# Patient Record
Sex: MALE | Race: WHITE | Employment: UNEMPLOYED | URBAN - METROPOLITAN AREA
[De-identification: names, ages, dates, MRNs, and addresses within clinical notes are randomized per-mention and may not be internally consistent; named-entity substitution may affect disease eponyms.]

---

## 2022-08-09 NOTE — PROGRESS NOTES
Name: Andre Wiley  YOB: 1975  Gender: male  MRN: [id number]    CC:   Chief Complaint   Patient presents with    Knee Pain     Right knee pain     Right medial KNEE PAIN; STIFFNESS     HPI: Complex history from June 22, 2021 when he fell on some water and tweaked his knee and hurt his back. He went to work comp that was denied. He ended up following with his primary doctor who sent him to therapy which did not seem to help. Eventually sent him to an MRI. Because of his wife's job they have moved around a good bit and he has had trouble getting further follow-up. He has had no injections. Pain is medial.  Intermittent pain but he fell today getting ready so was a little more inflamed. Also has chronic back issues. Has been on gabapentin for that. Not on File  History reviewed. No pertinent past medical history. History reviewed. No pertinent surgical history. History reviewed. No pertinent family history. Social History     Socioeconomic History    Marital status:      Spouse name: Not on file    Number of children: Not on file    Years of education: Not on file    Highest education level: Not on file   Occupational History    Not on file   Tobacco Use    Smoking status: Never    Smokeless tobacco: Never   Substance and Sexual Activity    Alcohol use: Not on file    Drug use: Not on file    Sexual activity: Not on file   Other Topics Concern    Not on file   Social History Narrative    Not on file     Social Determinants of Health     Financial Resource Strain: Not on file   Food Insecurity: Not on file   Transportation Needs: Not on file   Physical Activity: Not on file   Stress: Not on file   Social Connections: Not on file   Intimate Partner Violence: Not on file   Housing Stability: Not on file        No flowsheet data found. ROS:  Also noted on the patient medical history form in the chart and are reviewed today.  Pertinent positives and negatives are addressed with the patient, particularly those related to musculoskeletal concerns. Non-orthopaedic concerns were referred back to the primary care physician. Chronic back pain    PE:  General appearance is that of a healthy patient, alert and oriented, in no distress. Neck shows no significant abnormalities. Back and bilateral hips show no significant abnormalities with good ROM and no referral to LE with movement. No tenderness to bilateral hips. R and L upper extremities show no significant abnormalities. Both calves are soft. Dorsalis pedis pulses are 2+ and symmetrical.    Motor and sensory exam is intact and equal in both feet. KNEE Right(involved)  left   Skin Intact Intact   Atrophy None None   Effusion trace None noted   ROM  full Full   Strength No weakness No weakness   Palpation TTP medial joint line. Non-tender throughout   Patellar Tracking Normal: J sign: negative. Crepitus 1+ None   Patellar Apprehension Negative Negative   Maurizio Test positive Negative   Pivot Shift Negative Negative   Post Drawer Negative Negative   Varus  @ 0 and 30deg Negative Negative   Valgus @ 0 and 30deg Negative Negative   Gait antalgic Normal       An MRI of the right knee is reviewed from 6/30/2021. On the coronal images he has some edematous change possible cystic change in the periphery of the medial tibial plateau with some degenerative appearing tearing of the medial meniscus near the body and posterior horn. There may be a small flap of tissue in the medial gutter between the MCL and the tibial surface      Assessment:      ICD-10-CM    1. Right knee pain, unspecified chronicity  M25.561 triamcinolone acetonide (KENALOG-40) injection 80 mg     DRAIN/INJECT LARGE JOINT/BURSA     MRI KNEE RIGHT WO CONTRAST      2. Arthritis of right knee  M17.11 triamcinolone acetonide (KENALOG-40) injection 80 mg     DRAIN/INJECT LARGE JOINT/BURSA     MRI KNEE RIGHT WO CONTRAST      3.  Acute medial meniscus tear, right, initial encounter  S83.241A triamcinolone acetonide (KENALOG-40) injection 80 mg     DRAIN/INJECT LARGE JOINT/BURSA     MRI KNEE RIGHT WO CONTRAST          Plan:  I had a long discussion with the patient regarding the natural history of the problem, as well as treatment options. Discussed his exam is consistent with medial compartment arthritic change and possible meniscal tearing. His MRI is over a-year-old so I would suggest a follow-up MRI to evaluate this. Suggest an injection to help with acute pain. Also short course of oral NSAIDs. Treatment:   Given the chronicity and severity of the patient's symptoms, we will obtain an MRI. We will see them back after the scan and make a determination regarding further treatment. If there is a tear or other problem, they may require surgery. If negative, would recommend NSAID's, PT, or injection. They are amenable to this treatment plan. Patient prescribed with Non-steroidal anti-inflammatories after review of risks and benefits. We discussed additional activity modification to help reduce pain for initial conservative treatment. Procedure note:  After discussion of risks and benefits including, but not limited to pain, infection, steroid flare, fat necrosis, skin discoloration, increased blood sugar, and injury to blood vessels or nerves, the patient verbally consented to proceed with injection of the affected knee. We have discussed and they understand that decision to proceed with injection as part of the overall decision to avoid proceeding with major elective surgery. The Right knee(s) was sterilely prepped in standard fashion and injected with 2 cc of Kenalog (40mg/ml), 2 cc of Naropin (0.5%). The patient tolerated the injection(s) well. The dressing(s) was(were) applied. Return for After MRI.      Benny Barillas MD  08/11/22

## 2022-08-10 ENCOUNTER — OFFICE VISIT (OUTPATIENT)
Dept: ORTHOPEDIC SURGERY | Age: 47
End: 2022-08-10
Payer: COMMERCIAL

## 2022-08-10 DIAGNOSIS — S83.241A ACUTE MEDIAL MENISCUS TEAR, RIGHT, INITIAL ENCOUNTER: ICD-10-CM

## 2022-08-10 DIAGNOSIS — M25.561 RIGHT KNEE PAIN, UNSPECIFIED CHRONICITY: Primary | ICD-10-CM

## 2022-08-10 DIAGNOSIS — M17.11 ARTHRITIS OF RIGHT KNEE: ICD-10-CM

## 2022-08-10 DIAGNOSIS — M25.562 LEFT KNEE PAIN, UNSPECIFIED CHRONICITY: ICD-10-CM

## 2022-08-10 PROCEDURE — 20610 DRAIN/INJ JOINT/BURSA W/O US: CPT | Performed by: ORTHOPAEDIC SURGERY

## 2022-08-10 PROCEDURE — 99204 OFFICE O/P NEW MOD 45 MIN: CPT | Performed by: ORTHOPAEDIC SURGERY

## 2022-08-10 RX ORDER — TRIAMCINOLONE ACETONIDE 40 MG/ML
80 INJECTION, SUSPENSION INTRA-ARTICULAR; INTRAMUSCULAR ONCE
Status: COMPLETED | OUTPATIENT
Start: 2022-08-10 | End: 2022-08-10

## 2022-08-10 RX ADMIN — TRIAMCINOLONE ACETONIDE 80 MG: 40 INJECTION, SUSPENSION INTRA-ARTICULAR; INTRAMUSCULAR at 11:11

## 2022-08-11 RX ORDER — MELOXICAM 7.5 MG/1
7.5 TABLET ORAL 2 TIMES DAILY PRN
Qty: 28 TABLET | Refills: 0 | Status: SHIPPED | OUTPATIENT
Start: 2022-08-11 | End: 2022-08-25

## 2022-09-16 ENCOUNTER — HOSPITAL ENCOUNTER (OUTPATIENT)
Dept: MRI IMAGING | Age: 47
Discharge: HOME | End: 2022-09-19
Payer: COMMERCIAL

## 2022-09-16 DIAGNOSIS — S83.241A ACUTE MEDIAL MENISCUS TEAR, RIGHT, INITIAL ENCOUNTER: ICD-10-CM

## 2022-09-16 DIAGNOSIS — M25.561 RIGHT KNEE PAIN, UNSPECIFIED CHRONICITY: ICD-10-CM

## 2022-09-16 DIAGNOSIS — M17.11 ARTHRITIS OF RIGHT KNEE: ICD-10-CM

## 2022-09-16 PROCEDURE — 73721 MRI JNT OF LWR EXTRE W/O DYE: CPT

## 2022-11-16 ENCOUNTER — OFFICE VISIT (OUTPATIENT)
Dept: ORTHOPEDIC SURGERY | Age: 47
End: 2022-11-16

## 2022-11-16 VITALS — BODY MASS INDEX: 49.44 KG/M2 | WEIGHT: 315 LBS | HEIGHT: 67 IN

## 2022-11-16 DIAGNOSIS — S83.241D ACUTE MEDIAL MENISCUS TEAR OF RIGHT KNEE, SUBSEQUENT ENCOUNTER: ICD-10-CM

## 2022-11-16 DIAGNOSIS — M25.561 RIGHT KNEE PAIN, UNSPECIFIED CHRONICITY: Primary | ICD-10-CM

## 2022-11-16 DIAGNOSIS — M17.11 PRIMARY OSTEOARTHRITIS OF RIGHT KNEE: ICD-10-CM

## 2022-11-16 DIAGNOSIS — M17.11 ARTHRITIS OF RIGHT KNEE: ICD-10-CM

## 2022-11-16 PROBLEM — I10 HYPERTENSION: Status: ACTIVE | Noted: 2022-09-14

## 2022-11-16 PROBLEM — R29.90 NEUROSENSORY DEFICIT: Status: ACTIVE | Noted: 2022-09-14

## 2022-11-16 PROBLEM — R07.89 CHEST TIGHTNESS: Status: ACTIVE | Noted: 2022-09-14

## 2022-11-16 PROBLEM — G51.0 BELL'S PALSY: Status: ACTIVE | Noted: 2022-09-14

## 2022-11-16 PROBLEM — M54.50 LOWER BACK PAIN: Status: ACTIVE | Noted: 2022-09-14

## 2022-11-16 PROBLEM — K21.9 GERD (GASTROESOPHAGEAL REFLUX DISEASE): Status: ACTIVE | Noted: 2022-09-14

## 2022-11-16 RX ORDER — LOSARTAN POTASSIUM 50 MG/1
TABLET ORAL
COMMUNITY
Start: 2022-10-21

## 2022-11-16 RX ORDER — AMLODIPINE BESYLATE 5 MG/1
TABLET ORAL
COMMUNITY
Start: 2022-09-14

## 2022-11-16 RX ORDER — LORATADINE 10 MG/1
10 TABLET ORAL DAILY
COMMUNITY

## 2022-11-16 RX ORDER — OMEPRAZOLE 40 MG/1
40 CAPSULE, DELAYED RELEASE ORAL DAILY
COMMUNITY

## 2022-11-16 RX ORDER — PREGABALIN 75 MG/1
CAPSULE ORAL
COMMUNITY
Start: 2022-10-12

## 2022-11-16 RX ORDER — DICLOFENAC SODIUM 10 MG/G
GEL TOPICAL
COMMUNITY
Start: 2022-10-21

## 2022-11-16 RX ORDER — LISINOPRIL 20 MG/1
20 TABLET ORAL DAILY
COMMUNITY
Start: 2022-09-14

## 2022-11-16 RX ORDER — METOPROLOL TARTRATE 50 MG/1
TABLET, FILM COATED ORAL
COMMUNITY
Start: 2022-09-14

## 2022-11-16 RX ORDER — TRIAMCINOLONE ACETONIDE 40 MG/ML
80 INJECTION, SUSPENSION INTRA-ARTICULAR; INTRAMUSCULAR ONCE
Status: SHIPPED | OUTPATIENT
Start: 2022-11-16

## 2022-11-16 RX ORDER — LORAZEPAM 1 MG/1
TABLET ORAL
COMMUNITY
Start: 2022-11-02

## 2022-11-16 RX ORDER — ATORVASTATIN CALCIUM 80 MG/1
80 TABLET, FILM COATED ORAL
COMMUNITY
Start: 2022-09-14

## 2022-11-16 RX ORDER — METOPROLOL TARTRATE 50 MG/1
50 TABLET, FILM COATED ORAL 2 TIMES DAILY
COMMUNITY
Start: 2022-09-14

## 2022-11-16 RX ORDER — ASPIRIN 81 MG/1
81 TABLET ORAL DAILY
COMMUNITY
Start: 2022-09-15

## 2022-11-16 RX ORDER — FUROSEMIDE 20 MG/1
20 TABLET ORAL DAILY
COMMUNITY
Start: 2022-09-14

## 2022-11-16 RX ORDER — AMLODIPINE BESYLATE 5 MG/1
5 TABLET ORAL DAILY
COMMUNITY
Start: 2022-09-14

## 2022-11-16 RX ORDER — MELOXICAM 15 MG/1
TABLET ORAL
COMMUNITY
Start: 2022-10-21

## 2022-11-16 RX ORDER — AMLODIPINE BESYLATE 10 MG/1
TABLET ORAL
COMMUNITY
Start: 2022-10-13

## 2022-11-16 RX ORDER — ATORVASTATIN CALCIUM 80 MG/1
TABLET, FILM COATED ORAL
COMMUNITY
Start: 2022-09-14

## 2022-11-16 RX ORDER — VENLAFAXINE HYDROCHLORIDE 37.5 MG/1
CAPSULE, EXTENDED RELEASE ORAL
COMMUNITY
Start: 2022-10-21

## 2022-11-16 RX ORDER — FUROSEMIDE 20 MG/1
TABLET ORAL
COMMUNITY
Start: 2022-09-14

## 2022-11-16 RX ORDER — LISINOPRIL 20 MG/1
TABLET ORAL
COMMUNITY
Start: 2022-09-14

## 2022-11-16 NOTE — PROGRESS NOTES
Name: Eric Madden  YOB: 1975  Gender: male  MRN: [id number]    CC:   Chief Complaint   Patient presents with    Follow-up     MRI results right knee        HPI: Patient presents for follow-up of right knee pain. Patient was last seen on 8- when he had a cortisone injection. Patient was also given Mobic at that time. He notes only 1 week relief after his initial visit. The patient has since obtained an MRI of the knee. Today the patient states continued medial sided knee pain. Patient does note occasional swelling. He also notes some numbness and tingling that goes into the big toe. He does note some lumbar spine symptoms and that he is set up for neurology as well as an EMG/NCS. Allergies   Allergen Reactions    Codeine      Other reaction(s): Hives/Swelling-Allergy     History reviewed. No pertinent past medical history. History reviewed. No pertinent surgical history. History reviewed. No pertinent family history. Social History     Socioeconomic History    Marital status:      Spouse name: Not on file    Number of children: Not on file    Years of education: Not on file    Highest education level: Not on file   Occupational History    Not on file   Tobacco Use    Smoking status: Never    Smokeless tobacco: Never   Substance and Sexual Activity    Alcohol use: Not on file    Drug use: Not on file    Sexual activity: Not on file   Other Topics Concern    Not on file   Social History Narrative    Not on file     Social Determinants of Health     Financial Resource Strain: Not on file   Food Insecurity: Not on file   Transportation Needs: Not on file   Physical Activity: Not on file   Stress: Not on file   Social Connections: Not on file   Intimate Partner Violence: Not on file   Housing Stability: Not on file        No flowsheet data found.     Review of Systems  Non-contributory    PE right knee:    KNEE Right(involved)  left   Skin Intact Intact   Atrophy None None Effusion trace None noted   ROM  full Full   Strength No weakness No weakness   Palpation TTP medial joint line. Non-tender throughout   Patellar Tracking Normal: J sign: negative. Crepitus 1+ None   Patellar Apprehension Negative Negative   Maurizio Test positive Negative   Pivot Shift Negative Negative   Post Drawer Negative Negative   Varus  @ 0 and 30deg Negative Negative   Valgus @ 0 and 30deg Negative Negative   Gait antalgic Normal        Right knee MRI from 9-16-22:  Narrative   Exam: MRI right knee without contrast.   Indication: Right knee pain   Comparison: MRI right knee, 7/30/2021   Technique: Multiplanar multisequence imaging of the knee without contrast.       FINDINGS:   Menisci: There is complex predominantly radial tearing involving the medial   meniscal body which is unchanged in appearance from prior. There is mild free   edge radial tearing involving the lateral meniscal anterior and posterior horns   which is slightly increased in conspicuity, though potentially attributable to   technique. Cruciate ligaments: ACL and PCL are intact. Collateral ligaments: MCL and lateral collateral complex are intact. Tendons: The tendons including the extensor mechanism are intact. Bones: No fracture, contusion, or malalignment. Tricompartmental marginal   osteophytes. Articular cartilage:    Patellofemoral compartment: Partial thickness chondral loss in the central   trochlear sulcus is unchanged. Questionable small focus of partial-thickness   delamination to the patellar median ridge, not definitively seen on the prior. Medial compartment: No significant change in high-grade and full thickness   chondral loss throughout the weightbearing medial compartment with mild   subchondral marrow changes along the tibial plateau. Lateral compartment: Intact. Miscellaneous: Small joint effusion.  There is subcutaneous edema which is most   pronounced along the anterolateral aspect of the knee and lower leg. Impression   1. Unchanged complex predominantly radial tear of the medial meniscal body. 2. Mild free edge radial tearing of the lateral meniscal anterior and posterior   horns, slightly increased in conspicuity, though potentially attributable to   differences in technique. 3. Unchanged advanced chondral loss in the weightbearing medial compartment with   mild subchondral marrow changes to the tibial plateau. 4. Questionable small focus of new partial thickness chondral delamination to   the patellar median ridge. 5. Small joint effusion with subcutaneous edema most pronounced along the   anterolateral aspect of the knee and lower leg. MRIs independently reviewed. Patient has small area of tearing of the medial meniscus which is unchanged essentially based on prior review. Degenerative tearing of the lateral meniscus also was noted. He has advanced chondral arthritic change in the medial compartment with marrow edema seen at least on coronal image 17. Small effusion. A/Plan:     ICD-10-CM    1. Right knee pain, unspecified chronicity  M25.561       2. Arthritis of right knee  M17.11       3. Acute medial meniscus tear of right knee, subsequent encounter  S83.441D            I had an extensive discussion with the patient regarding both his meniscal pathology as well as his degenerative changes with marrow edema seen on MRI. We discussed from a surgical perspective, it is difficult to know how much relief and arthroscopy will offer. We discussed that this will not get rid of his degenerative changes. We discussed viscosupplementation as an option more conservatively. Patient also had questions about a knee replacement. I told him that he was rather young for this, but I would be more than happy to set him up with a joint surgeon for further discussion.   Currently he would like to try another steroid injection and then transition to viscosupplementation. He would like to participate in therapy as well. If not improving could still consider arthroscopy versus referral.  Discussed weight loss options and he is trying to proceed with gastric bypass they are just waiting on the surgeon. Procedure note:  After discussion of risks and benefits including, but not limited to pain, infection, steroid flare, fat necrosis, skin discoloration, increased blood sugar, and injury to blood vessels or nerves, the patient verbally consented to proceed with injection of the affected knee. We have discussed and they understand that decision to proceed with injection as part of the overall decision to avoid proceeding with major elective surgery. The Right knee(s) was sterilely prepped in standard fashion and injected with 2 cc of Kenalog (40mg/ml), 2 cc of Naropin (0.5%). The patient tolerated the injection(s) well. The dressing(s) was(were) applied. Recommend therapy to evaluate and treat current complaints and pathology. A home exercise program was also discussed with the patient. Return for will call after visco authorized.         Ger Bassett MD  11/16/22

## 2022-11-18 ENCOUNTER — TELEPHONE (OUTPATIENT)
Dept: ORTHOPEDIC SURGERY | Age: 47
End: 2022-11-18

## 2022-11-18 NOTE — TELEPHONE ENCOUNTER
Left VM for pt letting him know that we like to give the injection about 2-3 weeks before we say it did or did not work. I explained that sometimes there can be an immediate effect and then wear off and then more relief again. I let him know that we did submit the form for VISCO and are waiting on authorization and will call him once those are authorized or denied.

## 2022-11-18 NOTE — TELEPHONE ENCOUNTER
He was to let BSK know how the knee injections helped. He may have gotten a little relief that day but not much and not for long.

## 2023-05-14 ENCOUNTER — HOSPITAL ENCOUNTER (EMERGENCY)
Facility: HOSPITAL | Age: 48
Discharge: HOME | End: 2023-05-14
Attending: EMERGENCY MEDICINE | Admitting: EMERGENCY MEDICINE
Payer: COMMERCIAL

## 2023-05-14 VITALS
WEIGHT: 315 LBS | RESPIRATION RATE: 16 BRPM | OXYGEN SATURATION: 98 % | TEMPERATURE: 97.8 F | HEART RATE: 58 BPM | DIASTOLIC BLOOD PRESSURE: 83 MMHG | HEIGHT: 68 IN | SYSTOLIC BLOOD PRESSURE: 127 MMHG | BODY MASS INDEX: 47.74 KG/M2

## 2023-05-14 DIAGNOSIS — J32.9 SINUSITIS, UNSPECIFIED CHRONICITY, UNSPECIFIED LOCATION: Primary | ICD-10-CM

## 2023-05-14 PROCEDURE — 99282 EMERGENCY DEPT VISIT SF MDM: CPT

## 2023-05-14 RX ORDER — MELATONIN
1000 DAILY
Status: ON HOLD | COMMUNITY

## 2023-05-14 RX ORDER — AMLODIPINE BESYLATE 10 MG/1
10 TABLET ORAL DAILY
Status: ON HOLD | COMMUNITY

## 2023-05-14 RX ORDER — PREGABALIN 75 MG/1
75 CAPSULE ORAL 3 TIMES DAILY
Status: ON HOLD | COMMUNITY
End: 2023-05-21

## 2023-05-14 RX ORDER — OMEPRAZOLE 40 MG/1
40 CAPSULE, DELAYED RELEASE ORAL DAILY
Status: ON HOLD | COMMUNITY

## 2023-05-14 RX ORDER — ASPIRIN 81 MG/1
81 TABLET, CHEWABLE ORAL DAILY
Status: ON HOLD | COMMUNITY

## 2023-05-14 RX ORDER — ATORVASTATIN CALCIUM 80 MG/1
80 TABLET, FILM COATED ORAL DAILY
Status: ON HOLD | COMMUNITY
End: 2023-05-21

## 2023-05-14 RX ORDER — LORATADINE 10 MG/1
10 TABLET ORAL DAILY
Status: ON HOLD | COMMUNITY

## 2023-05-14 RX ORDER — AZITHROMYCIN 250 MG/1
TABLET, FILM COATED ORAL
Qty: 6 TABLET | Refills: 0 | Status: SHIPPED | OUTPATIENT
Start: 2023-05-14 | End: 2023-05-19

## 2023-05-14 RX ORDER — HYDROXYZINE HYDROCHLORIDE 25 MG/1
25 TABLET, FILM COATED ORAL NIGHTLY PRN
Qty: 10 TABLET | Refills: 0 | Status: ON HOLD | OUTPATIENT
Start: 2023-05-14 | End: 2023-05-21

## 2023-05-14 RX ORDER — MELOXICAM 7.5 MG/1
7.5 TABLET ORAL 2 TIMES DAILY
Status: ON HOLD | COMMUNITY
End: 2023-05-21

## 2023-05-14 RX ORDER — METOPROLOL TARTRATE 50 MG/1
50 TABLET, FILM COATED ORAL 2 TIMES DAILY
Status: ON HOLD | COMMUNITY
End: 2023-05-21

## 2023-05-14 RX ORDER — FUROSEMIDE 20 MG/1
20 TABLET ORAL DAILY
Status: ON HOLD | COMMUNITY
End: 2023-05-21

## 2023-05-14 NOTE — ED PROVIDER NOTES
Time: 9:48 AM EDT  Date of encounter:  5/14/2023  Independent Historian/Clinical History and Information was obtained by:   Patient  Chief Complaint: facial pain    History is limited by: N/A    History of Present Illness:  Patient is a 48 y.o. year old male who presents to the emergency department for evaluation of facial pain, ear pain, headache, sense of fullness in his ears that is gotten worse over the past 2 days.  In addition, the patient reports that he recently started a new antipsychotic and has had some issues with sleepwalking.  He states that he was able to stop the antipsychotic however he does not know the name of his medication.  Patient denies chest pain or shortness of breath.  Patient denies back pain.  Patient has no neck pain.  Patient has no fever or chills.  Patient has no cough or hemoptysis.  The patient does report that he does have neuropathy and so the tingling and pain that he gets in his hands and feet are not new.    HPI    Patient Care Team  Primary Care Provider: Provider, Ameena Known    Past Medical History:     Allergies   Allergen Reactions   • Codeine Hives     Past Medical History:   Diagnosis Date   • Anxiety    • Hypertension    • Intermittent explosive disorder    • Sleep apnea      Past Surgical History:   Procedure Laterality Date   • ESOPHAGEAL DILATATION     • JOINT REPLACEMENT       History reviewed. No pertinent family history.    Home Medications:  Prior to Admission medications    Medication Sig Start Date End Date Taking? Authorizing Provider   amLODIPine (NORVASC) 5 MG tablet Take 1 tablet by mouth Daily.    Baldomero Bardales MD   aspirin 81 MG chewable tablet Chew 1 tablet Daily.    Baldomero Bardales MD   atorvastatin (LIPITOR) 80 MG tablet Take 1 tablet by mouth Daily.    Baldomero Bardales MD   Cholecalciferol 25 MCG (1000 UT) tablet Take 1 tablet by mouth Daily.    Baldomero Bardales MD   furosemide (LASIX) 20 MG tablet Take 1 tablet by mouth Daily.    " Baldomero Bardales MD   LACTOBACILLUS PROBIOTIC PO Take  by mouth.    Baldomero Bardales MD   loratadine (CLARITIN) 10 MG tablet Take 1 tablet by mouth Daily.    Baldomero Bardales MD   meloxicam (MOBIC) 7.5 MG tablet Take 1 tablet by mouth 2 (Two) Times a Day.    Baldomero Bardales MD   metoprolol tartrate (LOPRESSOR) 50 MG tablet Take 1 tablet by mouth 2 (Two) Times a Day.    Baldomero Bardales MD   omeprazole (priLOSEC) 40 MG capsule Take 1 capsule by mouth Daily.    Baldomero Bardales MD   pregabalin (LYRICA) 75 MG capsule Take 1 capsule by mouth 3 (Three) Times a Day.    Baldomero Bardales MD        Social History:   Social History     Tobacco Use   • Smoking status: Never   • Smokeless tobacco: Never   Vaping Use   • Vaping Use: Never used   Substance Use Topics   • Alcohol use: Never   • Drug use: Never         Review of Systems:  Review of Systems   Constitutional: Negative for chills and fever.   HENT: Positive for congestion and ear pain. Negative for rhinorrhea and sore throat.    Eyes: Negative for pain and visual disturbance.   Respiratory: Negative for apnea, cough, chest tightness and shortness of breath.    Cardiovascular: Negative for chest pain and palpitations.   Gastrointestinal: Negative for abdominal pain, diarrhea, nausea and vomiting.   Genitourinary: Negative for difficulty urinating and dysuria.   Musculoskeletal: Negative for joint swelling and myalgias.   Skin: Negative for color change.   Neurological: Negative for seizures and headaches.   Psychiatric/Behavioral: Negative.    All other systems reviewed and are negative.       Physical Exam:  /83   Pulse 58   Temp 97.8 °F (36.6 °C) (Oral)   Resp 16   Ht 172.7 cm (68\")   Wt (!) 145 kg (320 lb)   SpO2 98%   BMI 48.66 kg/m²     Physical Exam  Vitals and nursing note reviewed.   Constitutional:       General: He is not in acute distress.     Appearance: Normal appearance. He is not toxic-appearing.   HENT: "      Head: Normocephalic and atraumatic.      Jaw: There is normal jaw occlusion.      Ears:      Comments: (+) bilateral tm effusions  Eyes:      General: Lids are normal.      Extraocular Movements: Extraocular movements intact.      Conjunctiva/sclera: Conjunctivae normal.      Pupils: Pupils are equal, round, and reactive to light.   Cardiovascular:      Rate and Rhythm: Normal rate and regular rhythm.      Pulses: Normal pulses.      Heart sounds: Normal heart sounds.   Pulmonary:      Effort: Pulmonary effort is normal. No respiratory distress.      Breath sounds: Normal breath sounds. No wheezing or rhonchi.   Abdominal:      General: Abdomen is flat.      Palpations: Abdomen is soft.      Tenderness: There is no abdominal tenderness. There is no guarding or rebound.   Musculoskeletal:         General: Normal range of motion.      Cervical back: Normal range of motion and neck supple.      Right lower leg: No edema.      Left lower leg: No edema.   Skin:     General: Skin is warm and dry.   Neurological:      Mental Status: He is alert and oriented to person, place, and time. Mental status is at baseline.   Psychiatric:         Mood and Affect: Mood normal.                  Procedures:  Procedures      Medical Decision Making:      Comorbidities that affect care:    Hypertension    External Notes reviewed:    Previous Clinic Note: Patient was seen for evaluation of hypertension and Stafford's esophagus      The following orders were placed and all results were independently analyzed by me:  No orders of the defined types were placed in this encounter.      Medications Given in the Emergency Department:  Medications - No data to display     ED Course:         Labs:    Lab Results (last 24 hours)     ** No results found for the last 24 hours. **           Imaging:    No Radiology Exams Resulted Within Past 24 Hours      Differential Diagnosis and Discussion:    Facial Pain/Swelling: Differential diagnosis  includes but is not limited to temporal arteritis, intracranial tumors, neuralgias, dental disease, ocular disease, TMJ syndrome, salivary gland disorders, sinusitis, cluster headaches, migraines, and psychogenic.        The patient presented to the emergency department with symptoms consistent with sinusitis. The patient is now resting comfortably, feels better, is alert, talkative, interactive and in no distress after ED treatment. The patient appears well and is able to tolerate PO fluids. Repeat examination is unremarkable and benign. The patient is neurologically intact, has a normal mental status, and is ambulatory in the ED.  The history, exam, diagnostic testing (if any) and the patient's current condition do not suggest meningitis, stroke, sepsis, subarachnoid hemorrhage, intracranial bleeding, encephalitis, temporal arteritis or other significant pathology to warrant further testing, continued ED treatment, admission, neurological consultation, for other specialist evaluation at this point.  The patient was started on Zithromax emergency department.  The vital signs have been stable. The patient's condition is stable and appropriate for discharge. The patient will pursue further outpatient evaluation with the primary care physician or other designated or consulting physician as indicated in the discharge instructions.  Patient reports that he has a follow-up appointment with his primary care doctor tomorrow.    MDM  Number of Diagnoses or Management Options           Patient Care Considerations:    CT HEAD: I considered ordering a noncontrast CT of the head, however Patient has a baseline mental status and a normal neurological exam.      Consultants/Shared Management Plan:    None    Social Determinants of Health:    Patient is independent, reliable, and has access to care.       Disposition and Care Coordination:    Discharged: The patient is suitable and stable for discharge with no need for  consideration of observation or admission.    I have explained the patient´s condition, diagnoses and treatment plan based on the information available to me at this time. I have answered questions and addressed any concerns. The patient has a good  understanding of the patient´s diagnosis, condition, and treatment plan as can be expected at this point. The vital signs have been stable. The patient´s condition is stable and appropriate for discharge from the emergency department.      The patient will pursue further outpatient evaluation with the primary care physician or other designated or consulting physician as outlined in the discharge instructions. They are agreeable to this plan of care and follow-up instructions have been explained in detail. The patient has received these instructions in written format and have expressed an understanding of the discharge instructions. The patient is aware that any significant change in condition or worsening of symptoms should prompt an immediate return to this or the closest emergency department or call to 911.  I have explained discharge medications and the need for follow up with the patient/caretakers. This was also printed in the discharge instructions. Patient was discharged with the following medications and follow up:      Medication List      New Prescriptions    azithromycin 250 MG tablet  Commonly known as: ZITHROMAX  Take 2 tablets by mouth Daily for 1 day, THEN 1 tablet Daily for 4 days.  Start taking on: May 14, 2023     hydrOXYzine 25 MG tablet  Commonly known as: ATARAX  Take 1 tablet by mouth At Night As Needed (sleep).           Where to Get Your Medications      These medications were sent to [facility]/pharmacy #[id number] - [place], [place] - [address] - [phone] [facility] [phone] FX  [address] [place] [address]    Hours: 24-hours Phone: [phone]   · azithromycin 250 MG tablet  · hydrOXYzine 25 MG tablet      Provider, No Known  [employer]  [facility] [id number]    In 2 days         Final diagnoses:   Sinusitis, unspecified chronicity, unspecified location        ED Disposition     ED Disposition   Discharge    Condition   Stable    Comment   --             This medical record created using voice recognition software.           Cristiano Valenzuela MD  05/14/23 1012       Cristiano Valenzuela MD  05/14/23 1031

## 2023-05-21 ENCOUNTER — APPOINTMENT (OUTPATIENT)
Dept: CT IMAGING | Facility: HOSPITAL | Age: 48
End: 2023-05-21
Payer: COMMERCIAL

## 2023-05-21 ENCOUNTER — HOSPITAL ENCOUNTER (INPATIENT)
Facility: HOSPITAL | Age: 48
LOS: 2 days | Discharge: HOME | End: 2023-05-23
Attending: EMERGENCY MEDICINE | Admitting: INTERNAL MEDICINE
Payer: COMMERCIAL

## 2023-05-21 ENCOUNTER — APPOINTMENT (OUTPATIENT)
Dept: CARDIOLOGY | Facility: HOSPITAL | Age: 48
End: 2023-05-21
Payer: COMMERCIAL

## 2023-05-21 ENCOUNTER — APPOINTMENT (OUTPATIENT)
Dept: GENERAL RADIOLOGY | Facility: HOSPITAL | Age: 48
End: 2023-05-21
Payer: COMMERCIAL

## 2023-05-21 ENCOUNTER — APPOINTMENT (OUTPATIENT)
Dept: MRI IMAGING | Facility: HOSPITAL | Age: 48
End: 2023-05-21
Payer: COMMERCIAL

## 2023-05-21 DIAGNOSIS — R53.1 LEFT-SIDED WEAKNESS: Primary | ICD-10-CM

## 2023-05-21 DIAGNOSIS — R26.2 DIFFICULTY IN WALKING: ICD-10-CM

## 2023-05-21 DIAGNOSIS — Z78.9 DECREASED ACTIVITIES OF DAILY LIVING (ADL): ICD-10-CM

## 2023-05-21 DIAGNOSIS — R13.10 DYSPHAGIA, UNSPECIFIED TYPE: ICD-10-CM

## 2023-05-21 LAB
ABO GROUP BLD: NORMAL
ABO GROUP BLD: NORMAL
ALBUMIN SERPL-MCNC: 4.3 G/DL (ref 3.5–5.2)
ALBUMIN/GLOB SERPL: 1.4 G/DL
ALP SERPL-CCNC: 106 U/L (ref 39–117)
ALT SERPL W P-5'-P-CCNC: 66 U/L (ref 1–41)
AMPHET+METHAMPHET UR QL: NEGATIVE
ANION GAP SERPL CALCULATED.3IONS-SCNC: 9.6 MMOL/L (ref 5–15)
APTT PPP: 29.8 SECONDS (ref 24.2–34.2)
AST SERPL-CCNC: 45 U/L (ref 1–40)
BARBITURATES UR QL SCN: NEGATIVE
BASOPHILS # BLD AUTO: 0.03 10*3/MM3 (ref 0–0.2)
BASOPHILS NFR BLD AUTO: 0.4 % (ref 0–1.5)
BENZODIAZ UR QL SCN: NEGATIVE
BH CV ECHO MEAS - AO ROOT DIAM: 3.6 CM
BH CV ECHO MEAS - EDV(CUBED): 109.2 ML
BH CV ECHO MEAS - EDV(MOD-SP2): 126 ML
BH CV ECHO MEAS - EDV(MOD-SP4): 170 ML
BH CV ECHO MEAS - EF(MOD-BP): 61.5 %
BH CV ECHO MEAS - EF(MOD-SP2): 52.4 %
BH CV ECHO MEAS - EF(MOD-SP4): 67.8 %
BH CV ECHO MEAS - ESV(CUBED): 37.3 ML
BH CV ECHO MEAS - ESV(MOD-SP2): 60 ML
BH CV ECHO MEAS - ESV(MOD-SP4): 54.7 ML
BH CV ECHO MEAS - FS: 30.1 %
BH CV ECHO MEAS - IVS/LVPW: 1.06 CM
BH CV ECHO MEAS - IVSD: 1.19 CM
BH CV ECHO MEAS - LA DIMENSION: 3.8 CM
BH CV ECHO MEAS - LAT PEAK E' VEL: 13.2 CM/SEC
BH CV ECHO MEAS - LV DIASTOLIC VOL/BSA (35-75): 68.1 CM2
BH CV ECHO MEAS - LV MASS(C)D: 206.3 GRAMS
BH CV ECHO MEAS - LV SYSTOLIC VOL/BSA (12-30): 21.9 CM2
BH CV ECHO MEAS - LVIDD: 4.8 CM
BH CV ECHO MEAS - LVIDS: 3.3 CM
BH CV ECHO MEAS - LVOT AREA: 4.2 CM2
BH CV ECHO MEAS - LVOT DIAM: 2.3 CM
BH CV ECHO MEAS - LVPWD: 1.12 CM
BH CV ECHO MEAS - MED PEAK E' VEL: 10.8 CM/SEC
BH CV ECHO MEAS - MV A MAX VEL: 64.7 CM/SEC
BH CV ECHO MEAS - MV DEC SLOPE: 684 CM/SEC2
BH CV ECHO MEAS - MV DEC TIME: 0.18 MSEC
BH CV ECHO MEAS - MV E MAX VEL: 123 CM/SEC
BH CV ECHO MEAS - MV E/A: 1.9
BH CV ECHO MEAS - RVDD: 3 CM
BH CV ECHO MEAS - SI(MOD-SP2): 26.4 ML/M2
BH CV ECHO MEAS - SI(MOD-SP4): 46.2 ML/M2
BH CV ECHO MEAS - SV(MOD-SP2): 66 ML
BH CV ECHO MEAS - SV(MOD-SP4): 115.3 ML
BH CV ECHO MEAS - TAPSE (>1.6): 3.9 CM
BH CV ECHO MEASUREMENTS AVERAGE E/E' RATIO: 10.25
BILIRUB SERPL-MCNC: 0.4 MG/DL (ref 0–1.2)
BILIRUB UR QL STRIP: NEGATIVE
BLD GP AB SCN SERPL QL: NEGATIVE
BUN SERPL-MCNC: 19 MG/DL (ref 6–20)
BUN/CREAT SERPL: 21.8 (ref 7–25)
CALCIUM SPEC-SCNC: 9.1 MG/DL (ref 8.6–10.5)
CANNABINOIDS SERPL QL: NEGATIVE
CHLORIDE SERPL-SCNC: 101 MMOL/L (ref 98–107)
CLARITY UR: CLEAR
CO2 SERPL-SCNC: 30.4 MMOL/L (ref 22–29)
COCAINE UR QL: NEGATIVE
COLOR UR: YELLOW
CREAT SERPL-MCNC: 0.87 MG/DL (ref 0.76–1.27)
DEPRECATED RDW RBC AUTO: 47.2 FL (ref 37–54)
EGFRCR SERPLBLD CKD-EPI 2021: 107.8 ML/MIN/1.73
EOSINOPHIL # BLD AUTO: 0.2 10*3/MM3 (ref 0–0.4)
EOSINOPHIL NFR BLD AUTO: 3 % (ref 0.3–6.2)
ERYTHROCYTE [DISTWIDTH] IN BLOOD BY AUTOMATED COUNT: 13.9 % (ref 12.3–15.4)
FENTANYL UR-MCNC: NEGATIVE NG/ML
GLOBULIN UR ELPH-MCNC: 3 GM/DL
GLUCOSE BLDC GLUCOMTR-MCNC: 83 MG/DL (ref 70–99)
GLUCOSE BLDC GLUCOMTR-MCNC: 92 MG/DL (ref 70–99)
GLUCOSE SERPL-MCNC: 122 MG/DL (ref 65–99)
GLUCOSE UR STRIP-MCNC: NEGATIVE MG/DL
HCT VFR BLD AUTO: 39.6 % (ref 37.5–51)
HGB BLD-MCNC: 13.3 G/DL (ref 13–17.7)
HGB UR QL STRIP.AUTO: NEGATIVE
HOLD SPECIMEN: NORMAL
HOLD SPECIMEN: NORMAL
IMM GRANULOCYTES # BLD AUTO: 0.03 10*3/MM3 (ref 0–0.05)
IMM GRANULOCYTES NFR BLD AUTO: 0.4 % (ref 0–0.5)
INR PPP: 1.06 (ref 0.86–1.15)
KETONES UR QL STRIP: NEGATIVE
LEFT ATRIUM VOLUME INDEX: 26.1 ML/M2
LEUKOCYTE ESTERASE UR QL STRIP.AUTO: NEGATIVE
LYMPHOCYTES # BLD AUTO: 2 10*3/MM3 (ref 0.7–3.1)
LYMPHOCYTES NFR BLD AUTO: 29.7 % (ref 19.6–45.3)
MAXIMAL PREDICTED HEART RATE: 174 BPM
MCH RBC QN AUTO: 31.4 PG (ref 26.6–33)
MCHC RBC AUTO-ENTMCNC: 33.6 G/DL (ref 31.5–35.7)
MCV RBC AUTO: 93.6 FL (ref 79–97)
METHADONE UR QL SCN: NEGATIVE
MONOCYTES # BLD AUTO: 0.59 10*3/MM3 (ref 0.1–0.9)
MONOCYTES NFR BLD AUTO: 8.8 % (ref 5–12)
NEUTROPHILS NFR BLD AUTO: 3.89 10*3/MM3 (ref 1.7–7)
NEUTROPHILS NFR BLD AUTO: 57.7 % (ref 42.7–76)
NITRITE UR QL STRIP: NEGATIVE
NRBC BLD AUTO-RTO: 0 /100 WBC (ref 0–0.2)
OPIATES UR QL: NEGATIVE
OXYCODONE UR QL SCN: NEGATIVE
PH UR STRIP.AUTO: 7 [PH] (ref 5–8)
PLATELET # BLD AUTO: 191 10*3/MM3 (ref 140–450)
PMV BLD AUTO: 10.3 FL (ref 6–12)
POTASSIUM SERPL-SCNC: 3.6 MMOL/L (ref 3.5–5.2)
PROT SERPL-MCNC: 7.3 G/DL (ref 6–8.5)
PROT UR QL STRIP: NEGATIVE
PROTHROMBIN TIME: 13.9 SECONDS (ref 11.8–14.9)
QT INTERVAL: 422 MS
RBC # BLD AUTO: 4.23 10*6/MM3 (ref 4.14–5.8)
RH BLD: POSITIVE
RH BLD: POSITIVE
SODIUM SERPL-SCNC: 141 MMOL/L (ref 136–145)
SP GR UR STRIP: 1.02 (ref 1–1.03)
STRESS TARGET HR: 148 BPM
T&S EXPIRATION DATE: NORMAL
TROPONIN T SERPL HS-MCNC: 10 NG/L
UROBILINOGEN UR QL STRIP: NORMAL
WBC NRBC COR # BLD: 6.74 10*3/MM3 (ref 3.4–10.8)
WHOLE BLOOD HOLD COAG: NORMAL
WHOLE BLOOD HOLD SPECIMEN: NORMAL

## 2023-05-21 PROCEDURE — 80307 DRUG TEST PRSMV CHEM ANLYZR: CPT | Performed by: INTERNAL MEDICINE

## 2023-05-21 PROCEDURE — 93306 TTE W/DOPPLER COMPLETE: CPT

## 2023-05-21 PROCEDURE — 94799 UNLISTED PULMONARY SVC/PX: CPT

## 2023-05-21 PROCEDURE — 85730 THROMBOPLASTIN TIME PARTIAL: CPT

## 2023-05-21 PROCEDURE — 85610 PROTHROMBIN TIME: CPT

## 2023-05-21 PROCEDURE — 93306 TTE W/DOPPLER COMPLETE: CPT | Performed by: INTERNAL MEDICINE

## 2023-05-21 PROCEDURE — 25010000002 SULFUR HEXAFLUORIDE MICROSPH 60.7-25 MG RECONSTITUTED SUSPENSION: Performed by: INTERNAL MEDICINE

## 2023-05-21 PROCEDURE — 25010000002 ENOXAPARIN PER 10 MG: Performed by: INTERNAL MEDICINE

## 2023-05-21 PROCEDURE — 99285 EMERGENCY DEPT VISIT HI MDM: CPT

## 2023-05-21 PROCEDURE — 70498 CT ANGIOGRAPHY NECK: CPT

## 2023-05-21 PROCEDURE — 86850 RBC ANTIBODY SCREEN: CPT

## 2023-05-21 PROCEDURE — 82948 REAGENT STRIP/BLOOD GLUCOSE: CPT

## 2023-05-21 PROCEDURE — 99221 1ST HOSP IP/OBS SF/LOW 40: CPT | Performed by: STUDENT IN AN ORGANIZED HEALTH CARE EDUCATION/TRAINING PROGRAM

## 2023-05-21 PROCEDURE — 86901 BLOOD TYPING SEROLOGIC RH(D): CPT

## 2023-05-21 PROCEDURE — 80053 COMPREHEN METABOLIC PANEL: CPT

## 2023-05-21 PROCEDURE — 0042T HC CT CEREBRAL PERFUSION W/WO CONTRAST: CPT

## 2023-05-21 PROCEDURE — 70450 CT HEAD/BRAIN W/O DYE: CPT

## 2023-05-21 PROCEDURE — 99223 1ST HOSP IP/OBS HIGH 75: CPT | Performed by: INTERNAL MEDICINE

## 2023-05-21 PROCEDURE — 70551 MRI BRAIN STEM W/O DYE: CPT

## 2023-05-21 PROCEDURE — 25510000001 IOPAMIDOL PER 1 ML: Performed by: EMERGENCY MEDICINE

## 2023-05-21 PROCEDURE — 81003 URINALYSIS AUTO W/O SCOPE: CPT

## 2023-05-21 PROCEDURE — 70496 CT ANGIOGRAPHY HEAD: CPT

## 2023-05-21 PROCEDURE — 25010000002 LORAZEPAM PER 2 MG: Performed by: INTERNAL MEDICINE

## 2023-05-21 PROCEDURE — 85025 COMPLETE CBC W/AUTO DIFF WBC: CPT

## 2023-05-21 PROCEDURE — 93005 ELECTROCARDIOGRAM TRACING: CPT

## 2023-05-21 PROCEDURE — 71045 X-RAY EXAM CHEST 1 VIEW: CPT

## 2023-05-21 PROCEDURE — 86900 BLOOD TYPING SEROLOGIC ABO: CPT

## 2023-05-21 PROCEDURE — 94660 CPAP INITIATION&MGMT: CPT

## 2023-05-21 PROCEDURE — 93010 ELECTROCARDIOGRAM REPORT: CPT | Performed by: INTERNAL MEDICINE

## 2023-05-21 PROCEDURE — 84484 ASSAY OF TROPONIN QUANT: CPT

## 2023-05-21 RX ORDER — ACETAMINOPHEN 650 MG/1
650 SUPPOSITORY RECTAL EVERY 4 HOURS PRN
Status: DISCONTINUED | OUTPATIENT
Start: 2023-05-21 | End: 2023-05-23 | Stop reason: HOSPADM

## 2023-05-21 RX ORDER — NITROGLYCERIN 0.4 MG/1
0.4 TABLET SUBLINGUAL
Status: DISCONTINUED | OUTPATIENT
Start: 2023-05-21 | End: 2023-05-23 | Stop reason: HOSPADM

## 2023-05-21 RX ORDER — ENOXAPARIN SODIUM 100 MG/ML
40 INJECTION SUBCUTANEOUS DAILY
Status: DISCONTINUED | OUTPATIENT
Start: 2023-05-21 | End: 2023-05-23 | Stop reason: HOSPADM

## 2023-05-21 RX ORDER — SODIUM CHLORIDE 9 MG/ML
40 INJECTION, SOLUTION INTRAVENOUS AS NEEDED
Status: DISCONTINUED | OUTPATIENT
Start: 2023-05-21 | End: 2023-05-23 | Stop reason: HOSPADM

## 2023-05-21 RX ORDER — SODIUM CHLORIDE 0.9 % (FLUSH) 0.9 %
10 SYRINGE (ML) INJECTION AS NEEDED
Status: DISCONTINUED | OUTPATIENT
Start: 2023-05-21 | End: 2023-05-23 | Stop reason: HOSPADM

## 2023-05-21 RX ORDER — LABETALOL 100 MG/1
50 TABLET, FILM COATED ORAL ONCE
Status: DISCONTINUED | OUTPATIENT
Start: 2023-05-21 | End: 2023-05-21

## 2023-05-21 RX ORDER — ACETAMINOPHEN 325 MG/1
650 TABLET ORAL EVERY 4 HOURS PRN
Status: DISCONTINUED | OUTPATIENT
Start: 2023-05-21 | End: 2023-05-23 | Stop reason: HOSPADM

## 2023-05-21 RX ORDER — VENLAFAXINE HYDROCHLORIDE 75 MG/1
1 CAPSULE, EXTENDED RELEASE ORAL DAILY
COMMUNITY
Start: 2023-04-26

## 2023-05-21 RX ORDER — LORAZEPAM 0.5 MG/1
0.5 TABLET ORAL 3 TIMES DAILY
COMMUNITY
Start: 2023-05-17 | End: 2023-05-23 | Stop reason: HOSPADM

## 2023-05-21 RX ORDER — ASPIRIN 300 MG/1
300 SUPPOSITORY RECTAL DAILY
Status: DISCONTINUED | OUTPATIENT
Start: 2023-05-21 | End: 2023-05-23 | Stop reason: HOSPADM

## 2023-05-21 RX ORDER — LOSARTAN POTASSIUM AND HYDROCHLOROTHIAZIDE 25; 100 MG/1; MG/1
1 TABLET ORAL DAILY
COMMUNITY
Start: 2023-02-24

## 2023-05-21 RX ORDER — METOPROLOL SUCCINATE 200 MG/1
1 TABLET, EXTENDED RELEASE ORAL DAILY
Status: ON HOLD | COMMUNITY
Start: 2023-04-14 | End: 2023-05-23 | Stop reason: SDUPTHER

## 2023-05-21 RX ORDER — LORAZEPAM 2 MG/ML
1 INJECTION INTRAMUSCULAR ONCE
Status: COMPLETED | OUTPATIENT
Start: 2023-05-21 | End: 2023-05-21

## 2023-05-21 RX ORDER — LABETALOL HYDROCHLORIDE 5 MG/ML
5 INJECTION, SOLUTION INTRAVENOUS ONCE
Status: COMPLETED | OUTPATIENT
Start: 2023-05-21 | End: 2023-05-21

## 2023-05-21 RX ORDER — ARIPIPRAZOLE 5 MG/1
2.5-5 TABLET ORAL
COMMUNITY
Start: 2023-04-26

## 2023-05-21 RX ORDER — SODIUM CHLORIDE 0.9 % (FLUSH) 0.9 %
10 SYRINGE (ML) INJECTION EVERY 12 HOURS SCHEDULED
Status: DISCONTINUED | OUTPATIENT
Start: 2023-05-21 | End: 2023-05-23 | Stop reason: HOSPADM

## 2023-05-21 RX ORDER — ASPIRIN 81 MG/1
81 TABLET, CHEWABLE ORAL DAILY
Status: DISCONTINUED | OUTPATIENT
Start: 2023-05-21 | End: 2023-05-23 | Stop reason: HOSPADM

## 2023-05-21 RX ORDER — MELOXICAM 15 MG/1
15 TABLET ORAL DAILY
COMMUNITY
Start: 2023-05-02

## 2023-05-21 RX ADMIN — SULFUR HEXAFLUORIDE 3 ML: KIT at 14:35

## 2023-05-21 RX ADMIN — IOPAMIDOL 150 ML: 755 INJECTION, SOLUTION INTRAVENOUS at 08:12

## 2023-05-21 RX ADMIN — Medication 10 ML: at 13:33

## 2023-05-21 RX ADMIN — LORAZEPAM 1 MG: 2 INJECTION INTRAMUSCULAR; INTRAVENOUS at 15:38

## 2023-05-21 RX ADMIN — ENOXAPARIN SODIUM 40 MG: 100 INJECTION SUBCUTANEOUS at 13:32

## 2023-05-21 RX ADMIN — LABETALOL HYDROCHLORIDE 5 MG: 5 INJECTION, SOLUTION INTRAVENOUS at 19:39

## 2023-05-21 RX ADMIN — Medication 10 ML: at 20:48

## 2023-05-21 NOTE — ED NOTES
AS soon at patient falls asleep his O2 sat drop  Pt states he has sleep apnea and uses a CPAP at home   MD lopez

## 2023-05-21 NOTE — ED NOTES
"Pt states that he has been having \"episodes like this for 1 month\" pt has facial drooping and weakness to L side.   "

## 2023-05-21 NOTE — H&P
[facility]  [facility]: [facility]   Patient Name: Hardy Rendon  : 1975  MRN: [id number]  Primary Care Physician:  Provider, No Known  Date of admission: 2023    Subjective   Subjective     Chief Complaint: Left-sided numbness    HPI:    Hardy Rendon is a 48 y.o. male with history of obstructive sleep apnea on CPAP at home, hypertension, intermittent explosive disorder, anxiety who had a fall in his shower this morning.  Patient says he vaguely remembers the fall.  During fall he hit the back side of his head.  Did not have a bruise and the area does not hurt.  Says he may have lost consciousness for few minutes.  He is not sure if he was sleepwalking when he fell.  Upon waking up he was feeling well and called his wife who suggested he goes to the ER.  Patient drove himself to the ER.  On his way he felt numbness in his left side of the face and arm.  He felt left lower extremity weakness as well.  Upon presentation to the ER, there was possible left-sided facial droop with left extremity weakness.  Stat neurology consult was placed.  Per neurology note TNK was administered due to risk outweigh benefit.  CTA head and neck was negative for acute pathology.  During my encounter with the patient, patient did not have left-sided facial droop.  He had some left hand weakness and tingling in his hand.  He was able to lift his left lower extremity without any issues.      Personal History     Past Medical History:  Past Medical History:   Diagnosis Date   • Anxiety    • Hypertension    • Intermittent explosive disorder    • Sleep apnea          Past Surgical History:  Past Surgical History:   Procedure Laterality Date   • ESOPHAGEAL DILATATION     • JOINT REPLACEMENT           Family History:   History reviewed. No pertinent family history.      Social History:   Social History     Socioeconomic History   • Marital status:    Tobacco Use   • Smoking status: Never   •  Smokeless tobacco: Never   Vaping Use   • Vaping Use: Never used   Substance and Sexual Activity   • Alcohol use: Never   • Drug use: Never         Home Medications:  Lactobacillus, amLODIPine, aspirin, atorvastatin, cholecalciferol, furosemide, hydrOXYzine, loratadine, meloxicam, metoprolol tartrate, omeprazole, and pregabalin    Allergies:  Allergies   Allergen Reactions   • Codeine Hives       Review of Systems   All systems were reviewed and negative except for: left hand weakness    Objective   Objective     Vitals:   Temp:  [98.7 °F (37.1 °C)] 98.7 °F (37.1 °C)  Heart Rate:  [51-79] 73  Resp:  [16] 16  BP: (147-176)/(73-98) 154/83    Physical Exam    Constitutional: Awake, alert, no acute distress   Eyes: Pupils equal, sclerae anicteric, no conjunctival injection   HENT: NCAT, mucous membranes moist   Neck: Supple, no thyromegaly, no lymphadenopathy, trachea midline   Respiratory: Clear to auscultation bilaterally, nonlabored respirations    Cardiovascular: RRR, no murmurs, rubs, or gallops, palpable pedal pulses bilaterally   Gastrointestinal: Positive bowel sounds, soft, nontender, nondistended   Musculoskeletal: No bilateral ankle edema, no clubbing or cyanosis to extremities   Psychiatric: Appropriate affect, cooperative   Neurologic: Oriented x 3, decrease left hand  strength. speech clear, no facial droop   Skin: No rashes     Result Review    Result Review:  I have personally reviewed the results from the time of this admission to 5/21/2023 10:51 EDT and agree with these findings:  [x]  Laboratory  []  Microbiology  [x]  Radiology  [x]  EKG/Telemetry   [x]  Cardiology/Vascular   []  Pathology  []  Old records  []  Other:      Assessment & Plan   Assessment / Plan     Assessment/Plan:    Acute TIA   -No evidence of acute stroke / no acute pathology on CTA head and neck  -Aspirin AK started. Can switch to p.o. once cleared by SLP.  Start atorvastatin 80 mg at that time as well.  -Evaluated by  neurology.  Not a candidate for TNK.  Neurology consulted.  -2D echo ordered.  -MRI brain ordered.  -Hold blood pressure medicines to allow for permissive hypertension  -telemetry bed   -check UDS    HTN  -Hold blood pressure medicines to allow for permissive hypertension  -Restart blood pressure medicines once cleared by neurology    JACOB   -Continue home CPAP    Intermittent explosive disorder  -Currently stable.  -Follows outpatient with psychiatrist.  Has been recently tried on various medications but was not able to tolerate any of them.    Elevated ALT/ AST   -mildly elevated   -can follow up outpatient with pcp for further work up  -monitor closely if atorvastatin is started      DVT prophylaxis:  Medical and mechanical DVT prophylaxis orders are present.    CODE STATUS:    Code Status (Patient has no pulse and is not breathing): CPR (Attempt to Resuscitate)  Medical Interventions (Patient has pulse or is breathing): Full Support  Release to patient: Routine Release      Admission Status:  I believe this patient meets inpatient status.    Electronically signed by Olvin Johnson MD, 05/21/23, 10:32 AM EDT.

## 2023-05-21 NOTE — CONSULTS
[facility]  [facility]      Patient Name:   Hardy Rendon  YOB: 1975  Identification Number:   MRN - [id number]  Date of Service:   05/21/2023 06:28:19    Diagnosis:      •  R53.1 - Weakness    Impression:      • 48yoM hx of HTN, sleep apnea, intermittent explosive disorder, and anxiety p/w left side weakness. Reliable LKW was 1:30AM. He went to shower sometimes after waking up, passed out for unclear reason, woke up with left side weakness. CTH neg for acute finding. Not thrombolytics candidate due to reliable LKW was > 4.5hr, possible GIB with bright blood in stool x 5 days with unknown cause, and previous episode of left side weakness may be TIA or CVA, no MRI done for evaluation. Explained to patient due to these concern, I would not recommend TNK due to risk outweigh benefit. Patient voiced understanding.      Recommend admission for stroke workup, continue aspirin, permissive HTn for 24hr. Exam is not suggestive of LVO. However given patient has disabling deficit and LKW within 24hr, recommend CTA/CTP.    Update: CTP neg for perfusion defect. CTA neg for LVO. pending radiology report.     Our recommendations are outlined below.    Recommendations:        •  Stroke/Telemetry Floor      •  Neuro Checks      •  Bedside Swallow Eval      •  DVT Prophylaxis      •  IV Fluids, Normal Saline      •  Head of Bed 30 Degrees      •  Euglycemia and Avoid Hyperthermia (PRN Acetaminophen)      •  Initiate or continue Aspirin 81 MG daily      •  Antihypertensives PRN if Blood pressure is greater than 220/120 or there is a concern for End organ damage/contraindications for permissive HTN. If blood pressure is greater than 220/120 give labetalol PO or IV or Vasotec IV with a goal of 15% reduction in BP during the first 24 hours.    Lipid Panel to Be Obtained, if Not Done in the Last 30 Days    Therapies:      •  Physical Therapy, Occupational Therapy, Speech Therapy  Assessment When Applicable    Dysphagia:      •  Swallow Evaluation, Bedside      •  NPO Until Swallow Evaluation    DVT prophylaxis:      •  Choice of Primary Team    Disposition:      •  Neurology Follow Up Recommended    Sign Out:      •  Discussed with Emergency Department Provider        ------------------------------------------------------------------------------    Advanced Imaging:  Advanced imaging has been ordered. Results pending.      Metrics:  Last Known Well: 05/21/2023 01:30:00  [facility] Notification Time: 05/21/2023 06:27:42  Arrival Time: 05/21/2023 06:25:00  Stamp Time: 05/21/2023 06:28:19  Initial Response Time: 05/21/2023 06:33:24  Symptoms: left side weakness .  Initial patient interaction: 05/21/2023 06:40:23  NIHSS Assessment Completed: 05/21/2023 06:56:23  Patient is not a candidate for Thrombolytic.  Thrombolytic Medical Decision: 05/21/2023 06:56:23  Patient was not deemed candidate for Thrombolytic because of following reasons:  GI Bleeding (Within 21 Days).    I personally Reviewed the CT Head and it Showed    Primary Provider Notified of Diagnostic Impression and Management Plan on: 05/21/2023 07:07:05        ------------------------------------------------------------------------------    Additional Comments:       Awaiting on history by family on potential contraindications    History of Present Illness:  Patient is a 48 year old Male.    Patient was brought by private transportation with symptoms of left side weakness .  Patient went to sleep at 1:30AM at baseline. He woke up something in the middle of the night and went to take a shower. He passed out in the shower for unclear duration and woke up with left sided weakness. He drove himself to the hospital and called his wife at 5:20AM. En route he noticed his left face was drooping. Patient reports he had 2-3 episode of left side weakness in the past month, lasted about 1 hour and he can usually shake it off. He reports  bright blood in the stool in past 5 days, denies any current hemorrhoid that he know of. Unclear cause of blood at this time.      Past Medical History:      • Hypertension  Othere PMH:  anxiety, HTN, intermittent explosive disorder, sleep apnea    Medications:    No Anticoagulant use   Antiplatelet use: Yes aspirin  Reviewed EMR for current medications    Allergies:   Reviewed  Description: codeine    Social History:  Smoking: No    Family History:    There is no family history of premature cerebrovascular disease pertinent to this consultation    ROS :  14 Points Review of Systems was performed and was negative except mentioned in HPI.    Past Surgical History:  There Is No Surgical History Contributory To Today’s Visit        Examination:  BP(143/73), Pulse(70),  1A: Level of Consciousness - Alert; keenly responsive + 0  1B: Ask Month and Age - Both Questions Right + 0  1C: Blink Eyes & Squeeze Hands - Performs Both Tasks + 0  2: Test Horizontal Extraocular Movements - Normal + 0  3: Test Visual Fields - No Visual Loss + 0  4: Test Facial Palsy (Use Grimace if Obtunded) - Unilateral Complete paralysis (upper/lower face) + 3  5A: Test Left Arm Motor Drift - No Drift for 10 Seconds + 0  5B: Test Right Arm Motor Drift - No Drift for 10 Seconds + 0  6A: Test Left Leg Motor Drift - Drift, but doesn't hit bed + 1  6B: Test Right Leg Motor Drift - No Drift for 5 Seconds + 0  7: Test Limb Ataxia (FNF/Heel-Shin) - No Ataxia + 0  8: Test Sensation - Mild-Moderate Loss: Less Sharp/More Dull + 1  9: Test Language/Aphasia - Normal; No aphasia + 0  10: Test Dysarthria - Mild-Moderate Dysarthria: Slurring but can be understood + 1  11: Test Extinction/Inattention - No abnormality + 0    NIHSS Score: 6  NIHSS Free Text : decrease sensation on left side arm leg. Left  weakness    Pre-Morbid Modified Kindra Scale:  0 Points = No symptoms at all    I reviewed the available imaging via A.I. software Rapid and initiated  discussion with the primary provider    Patient/Family was informed the Neurology Consult would occur via TeleHealth consult by way of interactive audio and video telecommunications and consented to receiving care in this manner.      Patient is being evaluated for possible acute neurologic impairment and high probability of imminent or life-threatening deterioration. I spent total of 35 minutes providing care to this patient, including time for face to face visit via telemedicine, review of medical records, imaging studies and discussion of findings with providers, the patient and/or family.      Dr Tiffanie Clayton      [facility]  [phone]    Case [id number]

## 2023-05-21 NOTE — ED PROVIDER NOTES
Time: 10:13 AM EDT  Date of encounter:  5/21/2023  Independent Historian/Clinical History and Information was obtained by:   Patient  Chief Complaint: Left-sided weakness    History is limited by: N/A    History of Present Illness:  Patient is a 48 y.o. year old male who presents to the emergency department for evaluation of left-sided weakness.  Patient reports left facial weakness, left upper extremity weakness but that the symptoms have been coming and going for couple days as well.  Denies headache.    HPI    Patient Care Team  Primary Care Provider: Provider, Ameena Known    Past Medical History:     Allergies   Allergen Reactions   • Codeine Hives     Past Medical History:   Diagnosis Date   • Anxiety    • Hypertension    • Intermittent explosive disorder    • Sleep apnea      Past Surgical History:   Procedure Laterality Date   • ESOPHAGEAL DILATATION       History reviewed. No pertinent family history.    Home Medications:  Prior to Admission medications    Medication Sig Start Date End Date Taking? Authorizing Provider   amLODIPine (NORVASC) 5 MG tablet Take 1 tablet by mouth Daily.    Baldomero Bardales MD   aspirin 81 MG chewable tablet Chew 1 tablet Daily.    Baldomero Bardales MD   atorvastatin (LIPITOR) 80 MG tablet Take 1 tablet by mouth Daily.    Baldomero Bardales MD   Cholecalciferol 25 MCG (1000 UT) tablet Take 1 tablet by mouth Daily.    Baldomero Bardales MD   furosemide (LASIX) 20 MG tablet Take 1 tablet by mouth Daily.    Baldomero Bardales MD   hydrOXYzine (ATARAX) 25 MG tablet Take 1 tablet by mouth At Night As Needed (sleep). 5/14/23   Cristiano Valenzuela MD   LACTOBACILLUS PROBIOTIC PO Take  by mouth.    Baldomero Bardales MD   loratadine (CLARITIN) 10 MG tablet Take 1 tablet by mouth Daily.    Baldomero Bardales MD   meloxicam (MOBIC) 7.5 MG tablet Take 1 tablet by mouth 2 (Two) Times a Day.    Baldomero Bardales MD   metoprolol tartrate (LOPRESSOR) 50 MG tablet Take 1  "tablet by mouth 2 (Two) Times a Day.    ProviderBaldomero MD   omeprazole (priLOSEC) 40 MG capsule Take 1 capsule by mouth Daily.    ProviderBaldomero MD   pregabalin (LYRICA) 75 MG capsule Take 1 capsule by mouth 3 (Three) Times a Day.    ProviderBaldomero MD        Social History:   Social History     Tobacco Use   • Smoking status: Never   • Smokeless tobacco: Never   Vaping Use   • Vaping Use: Never used   Substance Use Topics   • Alcohol use: Never   • Drug use: Never         Review of Systems:  Review of Systems   Constitutional: Negative for chills and fever.   HENT: Negative for congestion, rhinorrhea and sore throat.    Eyes: Negative for pain and visual disturbance.   Respiratory: Negative for apnea, cough, chest tightness and shortness of breath.    Cardiovascular: Negative for chest pain and palpitations.   Gastrointestinal: Negative for abdominal pain, diarrhea, nausea and vomiting.   Genitourinary: Negative for difficulty urinating and dysuria.   Musculoskeletal: Negative for joint swelling and myalgias.   Skin: Negative for color change.   Neurological: Negative for seizures and headaches.   Psychiatric/Behavioral: Negative.    All other systems reviewed and are negative.       Physical Exam:  /83   Pulse 73   Temp 98.7 °F (37.1 °C)   Resp 16   Ht 172.7 cm (68\")   Wt (!) 145 kg (320 lb)   SpO2 (!) 87%   BMI 48.66 kg/m²     Physical Exam  Vitals and nursing note reviewed.   Constitutional:       General: He is not in acute distress.     Appearance: Normal appearance. He is not toxic-appearing.   HENT:      Head: Normocephalic and atraumatic.      Jaw: There is normal jaw occlusion.   Eyes:      General: Lids are normal.      Extraocular Movements: Extraocular movements intact.      Conjunctiva/sclera: Conjunctivae normal.      Pupils: Pupils are equal, round, and reactive to light.   Cardiovascular:      Rate and Rhythm: Normal rate and regular rhythm.      Pulses: Normal " pulses.      Heart sounds: Normal heart sounds.   Pulmonary:      Effort: Pulmonary effort is normal. No respiratory distress.      Breath sounds: Normal breath sounds. No wheezing or rhonchi.   Abdominal:      General: Abdomen is flat.      Palpations: Abdomen is soft.      Tenderness: There is no abdominal tenderness. There is no guarding or rebound.   Musculoskeletal:         General: Normal range of motion.      Cervical back: Normal range of motion and neck supple.      Right lower leg: No edema.      Left lower leg: No edema.   Skin:     General: Skin is warm and dry.   Neurological:      Mental Status: He is alert and oriented to person, place, and time.      Comments: Left facial weakness, no pronator drift of the left upper extremity but decreased  strength of the left hand.   Psychiatric:         Mood and Affect: Mood normal.                  Procedures:  Procedures      Medical Decision Making:      Comorbidities that affect care:    Hypertension, Obesity    External Notes reviewed:    None      The following orders were placed and all results were independently analyzed by me:  Orders Placed This Encounter   Procedures   • CT Head Without Contrast Stroke Protocol   • CT Angiogram Head w AI Analysis of LVO   • CT Angiogram Neck   • CT CEREBRAL PERFUSION WITH & WITHOUT CONTRAST   • XR Chest 1 View   • MRI Brain Without Contrast   • [place] Draw   • Comprehensive Metabolic Panel   • Protime-INR   • aPTT   • Single High Sensitivity Troponin T   • Urinalysis With Microscopic If Indicated (No Culture) - Urine, Clean Catch   • CBC Auto Differential   • Hemoglobin A1c   • Lipid Panel   • CBC (No Diff)   • Comprehensive Metabolic Panel   • Urine Drug Screen - Urine, Clean Catch   • NPO Diet NPO Type: Strict NPO   • Initiate Department's Acute Stroke Process (Team D, Code 19, etc)   • Perform NIH Stroke Scale   • Measure Actual Weight   • Head of Bed 30 Degrees or Less   • Undress and Gown   • Vital Signs   •  Neuro Checks   • Notify MD for SBP < 80 or > 200   • Notify Provider for SBP greater than 140 if hemorrhagic Stroke   • No Hypotonic Fluids   • Nursing Swallow Assessment   • Vital Signs   • Pulse Oximetry, Continuous   • Telemetry - Place Orders & Notify Provider of Results When Patient Experiences Acute Chest Pain, Dysrhythmia or Respiratory Distress   • Notify Provider   • Nursing Dysphagia Screening (Complete Prior to Giving Anything By Mouth)   • RN to Place Order SLP Consult - Eval & Treat Choosing Reason of RN Dysphagia Screen Failed   • Nurse to Call MD or Nutrition Services for Diet if Patient Passes Dysphagia Screen   • Intake and Output   • Neuro Checks   • NIHSS Assessment   • Order CT Head Without Contrast for Neurological Decline   • Provide Stroke Education Material   • Saline Lock & Maintain IV Access   • Place Sequential Compression Device   • Maintain Sequential Compression Device   • Activity - Strict Bed Rest with HOB Flat   • Code Status and Medical Interventions:   • Inpatient Hospitalist Consult   • Inpatient Case Management  Consult   • Inpatient Diabetes Educator Consult   • Inpatient Neurology Consult Stroke   • OT Consult: Eval & Treat   • PT Consult: Eval & Treat As Tolerated   • Oxygen Therapy- Nasal Cannula; Titrate 1-6 LPM Per SpO2; 90 - 95%   • NIPPV (CPAP or BIPAP)   • SLP Consult: Eval & Treat Communication Disorder   • SLP Consult: Eval & Treat RN Dysphagia Screen Failed   • POC Glucose Once   • POC Glucose Q6H   • POC Glucose Once   • ECG 12 Lead ED Triage Standing Order; Acute Stroke (Onset <12 hrs)   • ECG 12 Lead Stroke Evaluation   • Adult Transthoracic Echo Complete W/ Cont if Necessary Per Protocol (With Agitated Saline)   • Type & Screen   • ABO RH Specimen Verification   • Insert Large Bore Peripheral IV - Right AC Preferred   • Insert Peripheral IV   • Inpatient Admission   • CBC & Differential   • Green Top (Gel)   • Lavender Top   • Gold Top - SST   •  Light Blue Top       Medications Given in the Emergency Department:  Medications   sodium chloride 0.9 % flush 10 mL (has no administration in time range)   nitroglycerin (NITROSTAT) SL tablet 0.4 mg (has no administration in time range)   sodium chloride 0.9 % flush 10 mL (10 mL Intravenous Given 5/21/23 1333)   sodium chloride 0.9 % flush 10 mL (has no administration in time range)   sodium chloride 0.9 % infusion 40 mL (has no administration in time range)   aspirin chewable tablet 81 mg (81 mg Oral Not Given 5/21/23 1336)     Or   aspirin suppository 300 mg ( Rectal Not Given:  See Alt 5/21/23 1336)   acetaminophen (TYLENOL) tablet 650 mg (has no administration in time range)     Or   acetaminophen (TYLENOL) suppository 650 mg (has no administration in time range)   Enoxaparin Sodium (LOVENOX) syringe 40 mg (40 mg Subcutaneous Given 5/21/23 1332)   LORazepam (ATIVAN) injection 1 mg (has no administration in time range)   iopamidol (ISOVUE-370) 76 % injection 150 mL (150 mL Intravenous Given 5/21/23 0812)        ED Course:         Labs:    Lab Results (last 24 hours)     Procedure Component Value Units Date/Time    Comprehensive Metabolic Panel [[id number]]  (Abnormal) Collected: 05/21/23 0640    Specimen: Blood Updated: 05/21/23 0704     Glucose 122 mg/dL      BUN 19 mg/dL      Creatinine 0.87 mg/dL      Sodium 141 mmol/L      Potassium 3.6 mmol/L      Chloride 101 mmol/L      CO2 30.4 mmol/L      Calcium 9.1 mg/dL      Total Protein 7.3 g/dL      Albumin 4.3 g/dL      ALT (SGPT) 66 U/L      AST (SGOT) 45 U/L      Alkaline Phosphatase 106 U/L      Total Bilirubin 0.4 mg/dL      Globulin 3.0 gm/dL      A/G Ratio 1.4 g/dL      BUN/Creatinine Ratio 21.8     Anion Gap 9.6 mmol/L      eGFR 107.8 mL/min/1.73     Narrative:      GFR Normal >60  Chronic Kidney Disease <60  Kidney Failure <15      Single High Sensitivity Troponin T [[id number]]  (Normal) Collected: 05/21/23 0640    Specimen: Blood Updated: 05/21/23 0704      HS Troponin T 10 ng/L     Narrative:      High Sensitive Troponin T Reference Range:  <10.0 ng/L- Negative Female for AMI  <15.0 ng/L- Negative Male for AMI  >=10 - Abnormal Female indicating possible myocardial injury.  >=15 - Abnormal Male indicating possible myocardial injury.   Clinicians would have to utilize clinical acumen, EKG, Troponin, and serial changes to determine if it is an Acute Myocardial Infarction or myocardial injury due to an underlying chronic condition.         CBC & Differential [[id number]]  (Normal) Collected: 05/21/23 0641    Specimen: Blood Updated: 05/21/23 0645    Narrative:      The following orders were created for panel order CBC & Differential.  Procedure                               Abnormality         Status                     ---------                               -----------         ------                     CBC Auto Differential[[id number]]        Normal              Final result                 Please view results for these tests on the individual orders.    Protime-INR [[id number]]  (Normal) Collected: 05/21/23 0641    Specimen: Blood Updated: 05/21/23 0653     Protime 13.9 Seconds      INR 1.06    Narrative:      Suggested Therapeutic Ranges For Oral Anticoagulant Therapy:  Level of Therapy                      INR Target Range  Standard Dose                            2.0-3.0  High Dose                                2.5-3.5  Patients not receiving anticoagulant  Therapy Normal Range                     0.86-1.15    aPTT [[id number]]  (Normal) Collected: 05/21/23 0641    Specimen: Blood Updated: 05/21/23 0654     PTT 29.8 seconds     CBC Auto Differential [[id number]]  (Normal) Collected: 05/21/23 0641    Specimen: Blood Updated: 05/21/23 0645     WBC 6.74 10*3/mm3      RBC 4.23 10*6/mm3      Hemoglobin 13.3 g/dL      Hematocrit 39.6 %      MCV 93.6 fL      MCH 31.4 pg      MCHC 33.6 g/dL      RDW 13.9 %      RDW-SD 47.2 fl      MPV 10.3 fL      Platelets 191 10*3/mm3       Neutrophil % 57.7 %      Lymphocyte % 29.7 %      Monocyte % 8.8 %      Eosinophil % 3.0 %      Basophil % 0.4 %      Immature Grans % 0.4 %      Neutrophils, Absolute 3.89 10*3/mm3      Lymphocytes, Absolute 2.00 10*3/mm3      Monocytes, Absolute 0.59 10*3/mm3      Eosinophils, Absolute 0.20 10*3/mm3      Basophils, Absolute 0.03 10*3/mm3      Immature Grans, Absolute 0.03 10*3/mm3      nRBC 0.0 /100 WBC     Urinalysis With Microscopic If Indicated (No Culture) - Urine, Clean Catch [[id number]]  (Normal) Collected: 05/21/23 0802    Specimen: Urine, Clean Catch Updated: 05/21/23 0811     Color, UA Yellow     Appearance, UA Clear     pH, UA 7.0     Specific Gravity, UA 1.021     Glucose, UA Negative     Ketones, UA Negative     Bilirubin, UA Negative     Blood, UA Negative     Protein, UA Negative     Leuk Esterase, UA Negative     Nitrite, UA Negative     Urobilinogen, UA 1.0 E.U./dL    Narrative:      Urine microscopic not indicated.    Urine Drug Screen - Urine, Clean Catch [[id number]]  (Normal) Collected: 05/21/23 0802    Specimen: Urine, Clean Catch Updated: 05/21/23 1244     Amphet/Methamphet, Screen Negative     Barbiturates Screen, Urine Negative     Benzodiazepine Screen, Urine Negative     Cocaine Screen, Urine Negative     Opiate Screen Negative     THC, Screen, Urine Negative     Methadone Screen, Urine Negative     Oxycodone Screen, Urine Negative     Fentanyl, Urine Negative    Narrative:      Negative Thresholds Per Drugs Screened:    Amphetamines                 500 ng/ml  Barbiturates                 200 ng/ml  Benzodiazepines              100 ng/ml  Cocaine                      300 ng/ml  Methadone                    300 ng/ml  Opiates                      300 ng/ml  Oxycodone                    100 ng/ml  THC                           50 ng/ml  Fentanyl                       5 ng/ml      The Normal Value for all drugs tested is negative. This report includes final unconfirmed screening results to be  used for medical treatment purposes only. Unconfirmed results must not be used for non-medical purposes such as employment or legal testing. Clinical consideration should be applied to any drug of abuse test, particularly when unconfirmed results are used.            POC Glucose Once [[id number]]  (Normal) Collected: 05/21/23 1139    Specimen: Blood Updated: 05/21/23 1141     Glucose 92 mg/dL      Comment: Serial Number: [id number]:  [id number]              Imaging:    CT Angiogram Neck    Result Date: 5/21/2023  PROCEDURE: CT ANGIOGRAM NECK  COMPARISON: [facility], CT, CT HEAD WO CONTRAST STROKE PROTOCOL, 5/21/2023, 6:34.  INDICATIONS: Neuro deficit, acute, stroke suspected  PROTOCOL:   Standard imaging protocol performed    RADIATION:   DLP: 945.1 mGy*cm   Automated exposure control was utilized to minimize radiation dose. CONTRAST: 75 cc Isovue 370 I.V.  TECHNIQUE: After obtaining the patient's consent, CT images of the neck were obtained without and with non-ionic intravenous contrast material. Multi-planar reformatted/3-D images were created to optimize visualization of vascular anatomy. Unless otherwise stated in this report, all vascular stenoses involving the internal carotid arteries reported for this examination are derived by dividing the lesion diameter by the diameter of the normal internal carotid artery more distally.  FINDINGS:  LEFT INTERNAL CAROTID: No hemodynamically significant stenosis or dissection.  EXTERNAL CAROTID: No hemodynamically significant stenosis or dissection.  COMMON CAROTID: No hemodynamically significant stenosis or dissection.  VERTEBRAL: No hemodynamically significant stenosis or dissection.   RIGHT INTERNAL CAROTID: No hemodynamically significant stenosis or dissection.  EXTERNAL CAROTID: No hemodynamically significant stenosis or dissection.  COMMON CAROTID: No hemodynamically significant stenosis or dissection.  VERTEBRAL: No hemodynamically significant  stenosis or dissection.   OTHER: The visualized soft tissues of the neck are also unremarkable.  Degenerative changes are present in the cervical spine.       Normal CT angiogram of the neck.      NICANOR LINDO MD       Electronically Signed and Approved By: NICANOR LINDO MD on 5/21/2023 at 9:46             XR Chest 1 View    Result Date: 5/21/2023  PROCEDURE: XR CHEST 1 VW  COMPARISON: None  INDICATIONS: Acute Stroke Protocol (Onset < 12 hrs)  FINDINGS:   The lungs are well-expanded. The heart and pulmonary vasculature are within normal limits. No pleural effusions are identified. There are no active appearing infiltrates.  IMPRESSION: No active disease.  NICANOR LINDO MD       Electronically Signed and Approved By: NICANOR ILNDO MD on 5/21/2023 at 7:37             CT Head Without Contrast Stroke Protocol    Result Date: 5/21/2023  PROCEDURE: CT HEAD WO CONTRAST STROKE PROTOCOL  COMPARISON:  None INDICATIONS: Neuro deficit, acute, stroke suspected  PROTOCOL:   Standard imaging protocol performed    RADIATION:   DLP: 954.5 mGy*cm   MA and/or KV was adjusted to minimize radiation dose.     TECHNIQUE: Axial images of the head without intravenous contrast.  FINDINGS:  The ventricles have a normal size and configuration. There is no evidence of acute intracranial hemorrhage, mass or midline shift. No extra-axial fluid collections are identified. There are no skull fractures. The visualized paranasal sinuses and mastoid air cells are grossly clear.  IMPRESSION: Normal exam.  NICANOR LINDO MD       Electronically Signed and Approved By: NICANOR LINDO MD on 5/21/2023 at 6:43             CT Angiogram Head w AI Analysis of LVO    Result Date: 5/21/2023  PROCEDURE: CT ANGIOGRAM HEAD W AI ANALYSIS OF LVO  COMPARISON: [facility], CT, CT HEAD WO CONTRAST STROKE PROTOCOL, 5/21/2023, 6:34.  INDICATIONS: Neuro deficit, acute, stroke suspected  PROTOCOL:   Standard imaging protocol performed     RADIATION:   DLP: 945.1 mGy*cm   Automated exposure control was utilized to minimize radiation dose. CONTRAST: 75 cc Isovue 370 I.V.  TECHNIQUE: After obtaining the patient's consent, CT images of the head were obtained without and with non-ionic contrast, and multi-planar/3-D imaging were created and interpreted to optimize visualization of vascular anatomy.   FINDINGS:   The intracranial portion of the internal carotid arteries, major MCA and MARLENY branches are patent.  The distal vertebral arteries, basilar artery and posterior cerebral arteries are patent.  No evidence of large vessel occlusion, high-grade stenosis, dissection or aneurysm.  There is mild polypoid mucosal thickening in the maxillary sinuses.  IMPRESSION: Normal CT angiogram of the head.   NICANOR LINDO MD       Electronically Signed and Approved By: NICANOR LINDO MD on 5/21/2023 at 9:48             CT CEREBRAL PERFUSION WITH & WITHOUT CONTRAST    Result Date: 5/21/2023  PROCEDURE: CT CEREBRAL PERFUSION W WO CONTRAST  COMPARISON: [facility], CT, CT HEAD WO CONTRAST STROKE PROTOCOL, 5/21/2023, 6:34.  INDICATIONS: Neuro deficit, acute, stroke suspected  PROTOCOL:   Standard imaging protocol performed    RADIATION:   DLP: 1301.4 mGy*cm   Automated exposure control was utilized to minimize radiation dose. CONTRAST: 80 cc Isovue 370 I.V.   FINDINGS: No CT perfusion defects are identified.  CBF less than 30% 0 mL.  T-max greater than 6 seconds 0 mL.       Normal examination.       NICANOR LINDO MD       Electronically Signed and Approved By: NICANOR LINDO MD on 5/21/2023 at 8:29                 Differential Diagnosis and Discussion:    Weakness: Based on the patient's history, signs, and symptoms, the diffential diagnosis includes but is not limited to meningitis, stroke, sepsis, subarachnoid hemorrhage, intracranial bleeding, encephalitis, acute uti, dehydration, MS, myasthenia gravis, Guillan [place], migraine variant,  neuromuscular disorders vertigo, electrolyte imbalance, and metabolic disorders.    All labs were reviewed and interpreted by me.  All X-rays impressions were independently interpreted by me.  CT scan radiology impression was interpreted by me.    MDM  Number of Diagnoses or Management Options  Left-sided weakness  Diagnosis management comments: In summary this is a 48-year-old male who presents to the emergency department for evaluation of strokelike symptoms.  On examination he has have weakness of the left face and weakness of the left hand.  He has been evaluated by teleneurology and recommended for inpatient admission and further work-up of the stroke symptoms.  He is not a candidate for thrombolytics at this time.  CT brain, CT perfusion, CTA head neck unremarkable for acute pathology.  CBC independently reviewed by me and shows no critical abnormalities.  CMP independently reviewed by me and shows no critical abnormalities.  Patient case has been discussed with the hospitalist team who will admit to the hospital for further evaluation and continuation of treatment.           Patient Care Considerations:    MRI: I considered ordering an MRI however This can be done inpatient.      Consultants/Shared Management Plan:    Hospitalist: I have discussed the case with Dr. Johnson who agrees to accept the patient for admission.  Consultant: I have discussed the case with Teleneurology who agrees to consult on the patient.    Social Determinants of Health:    Patient is independent, reliable, and has access to care.       Disposition and Care Coordination:    Admit:   Through independent evaluation of the patient's history, physical, and imperical data, the patient meets criteria for observation/admission to the hospital.        Final diagnoses:   Left-sided weakness        ED Disposition     ED Disposition   Decision to Admit    Condition   --    Comment   Level of Care: Telemetry [5]   Diagnosis: Left-sided weakness  [[id number]]   Admitting Physician: DHAVAL NEUMANN [[id number]]   Certification: I Certify That Inpatient Hospital Services Are Medically Necessary For Greater Than 2 Midnights               This medical record created using voice recognition software.           Alfredo Aguilar MD  05/21/23 [id number]

## 2023-05-21 NOTE — PLAN OF CARE
Goal Outcome Evaluation:            New pt this shift, pt doing well, currently resting on bipap. No issues noted this shift

## 2023-05-22 LAB
ALBUMIN SERPL-MCNC: 4.3 G/DL (ref 3.5–5.2)
ALBUMIN/GLOB SERPL: 1.5 G/DL
ALP SERPL-CCNC: 103 U/L (ref 39–117)
ALT SERPL W P-5'-P-CCNC: 59 U/L (ref 1–41)
ANION GAP SERPL CALCULATED.3IONS-SCNC: 8.7 MMOL/L (ref 5–15)
AST SERPL-CCNC: 37 U/L (ref 1–40)
BILIRUB SERPL-MCNC: 0.8 MG/DL (ref 0–1.2)
BUN SERPL-MCNC: 16 MG/DL (ref 6–20)
BUN/CREAT SERPL: 19.8 (ref 7–25)
CALCIUM SPEC-SCNC: 9.1 MG/DL (ref 8.6–10.5)
CHLORIDE SERPL-SCNC: 102 MMOL/L (ref 98–107)
CHOLEST SERPL-MCNC: 139 MG/DL (ref 0–200)
CO2 SERPL-SCNC: 30.3 MMOL/L (ref 22–29)
CREAT SERPL-MCNC: 0.81 MG/DL (ref 0.76–1.27)
DEPRECATED RDW RBC AUTO: 47.6 FL (ref 37–54)
EGFRCR SERPLBLD CKD-EPI 2021: 110.1 ML/MIN/1.73
ERYTHROCYTE [DISTWIDTH] IN BLOOD BY AUTOMATED COUNT: 13.7 % (ref 12.3–15.4)
GLOBULIN UR ELPH-MCNC: 2.9 GM/DL
GLUCOSE BLDC GLUCOMTR-MCNC: 91 MG/DL (ref 70–99)
GLUCOSE BLDC GLUCOMTR-MCNC: 95 MG/DL (ref 70–99)
GLUCOSE SERPL-MCNC: 113 MG/DL (ref 65–99)
HBA1C MFR BLD: 5.9 % (ref 4.8–5.6)
HCT VFR BLD AUTO: 40 % (ref 37.5–51)
HDLC SERPL-MCNC: 32 MG/DL (ref 40–60)
HGB BLD-MCNC: 13.4 G/DL (ref 13–17.7)
LDLC SERPL CALC-MCNC: 81 MG/DL (ref 0–100)
LDLC/HDLC SERPL: 2.41 {RATIO}
MCH RBC QN AUTO: 32.1 PG (ref 26.6–33)
MCHC RBC AUTO-ENTMCNC: 33.5 G/DL (ref 31.5–35.7)
MCV RBC AUTO: 95.7 FL (ref 79–97)
PLATELET # BLD AUTO: 191 10*3/MM3 (ref 140–450)
PMV BLD AUTO: 10.2 FL (ref 6–12)
POTASSIUM SERPL-SCNC: 3.7 MMOL/L (ref 3.5–5.2)
PROT SERPL-MCNC: 7.2 G/DL (ref 6–8.5)
QT INTERVAL: 410 MS
RBC # BLD AUTO: 4.18 10*6/MM3 (ref 4.14–5.8)
SODIUM SERPL-SCNC: 141 MMOL/L (ref 136–145)
TRIGL SERPL-MCNC: 149 MG/DL (ref 0–150)
VLDLC SERPL-MCNC: 26 MG/DL (ref 5–40)
WBC NRBC COR # BLD: 5.99 10*3/MM3 (ref 3.4–10.8)

## 2023-05-22 PROCEDURE — 94761 N-INVAS EAR/PLS OXIMETRY MLT: CPT

## 2023-05-22 PROCEDURE — 97165 OT EVAL LOW COMPLEX 30 MIN: CPT

## 2023-05-22 PROCEDURE — 94799 UNLISTED PULMONARY SVC/PX: CPT

## 2023-05-22 PROCEDURE — 80053 COMPREHEN METABOLIC PANEL: CPT | Performed by: INTERNAL MEDICINE

## 2023-05-22 PROCEDURE — 93005 ELECTROCARDIOGRAM TRACING: CPT | Performed by: INTERNAL MEDICINE

## 2023-05-22 PROCEDURE — 97161 PT EVAL LOW COMPLEX 20 MIN: CPT

## 2023-05-22 PROCEDURE — 83036 HEMOGLOBIN GLYCOSYLATED A1C: CPT | Performed by: INTERNAL MEDICINE

## 2023-05-22 PROCEDURE — 25010000002 HYDRALAZINE PER 20 MG: Performed by: INTERNAL MEDICINE

## 2023-05-22 PROCEDURE — 85027 COMPLETE CBC AUTOMATED: CPT | Performed by: INTERNAL MEDICINE

## 2023-05-22 PROCEDURE — 25010000002 ENOXAPARIN PER 10 MG: Performed by: INTERNAL MEDICINE

## 2023-05-22 PROCEDURE — 92610 EVALUATE SWALLOWING FUNCTION: CPT

## 2023-05-22 PROCEDURE — 99233 SBSQ HOSP IP/OBS HIGH 50: CPT | Performed by: INTERNAL MEDICINE

## 2023-05-22 PROCEDURE — 36415 COLL VENOUS BLD VENIPUNCTURE: CPT | Performed by: INTERNAL MEDICINE

## 2023-05-22 PROCEDURE — 82948 REAGENT STRIP/BLOOD GLUCOSE: CPT

## 2023-05-22 PROCEDURE — 80061 LIPID PANEL: CPT | Performed by: INTERNAL MEDICINE

## 2023-05-22 RX ORDER — PANTOPRAZOLE SODIUM 40 MG/1
40 TABLET, DELAYED RELEASE ORAL
Status: DISCONTINUED | OUTPATIENT
Start: 2023-05-22 | End: 2023-05-23 | Stop reason: HOSPADM

## 2023-05-22 RX ORDER — ATORVASTATIN CALCIUM 40 MG/1
80 TABLET, FILM COATED ORAL NIGHTLY
Status: DISCONTINUED | OUTPATIENT
Start: 2023-05-22 | End: 2023-05-23 | Stop reason: HOSPADM

## 2023-05-22 RX ORDER — AMLODIPINE BESYLATE 5 MG/1
10 TABLET ORAL DAILY
Status: DISCONTINUED | OUTPATIENT
Start: 2023-05-22 | End: 2023-05-23 | Stop reason: HOSPADM

## 2023-05-22 RX ORDER — HYDRALAZINE HYDROCHLORIDE 20 MG/ML
10 INJECTION INTRAMUSCULAR; INTRAVENOUS EVERY 6 HOURS PRN
Status: DISCONTINUED | OUTPATIENT
Start: 2023-05-22 | End: 2023-05-23 | Stop reason: HOSPADM

## 2023-05-22 RX ORDER — CETIRIZINE HYDROCHLORIDE 10 MG/1
10 TABLET ORAL DAILY
Status: DISCONTINUED | OUTPATIENT
Start: 2023-05-22 | End: 2023-05-23 | Stop reason: HOSPADM

## 2023-05-22 RX ORDER — HYDROCHLOROTHIAZIDE 25 MG/1
25 TABLET ORAL
Status: DISCONTINUED | OUTPATIENT
Start: 2023-05-22 | End: 2023-05-23 | Stop reason: HOSPADM

## 2023-05-22 RX ORDER — LOSARTAN POTASSIUM 50 MG/1
100 TABLET ORAL
Status: DISCONTINUED | OUTPATIENT
Start: 2023-05-22 | End: 2023-05-23 | Stop reason: HOSPADM

## 2023-05-22 RX ORDER — MELATONIN
1000 DAILY
Status: DISCONTINUED | OUTPATIENT
Start: 2023-05-22 | End: 2023-05-23 | Stop reason: HOSPADM

## 2023-05-22 RX ORDER — CLOPIDOGREL BISULFATE 75 MG/1
75 TABLET ORAL DAILY
Status: DISCONTINUED | OUTPATIENT
Start: 2023-05-22 | End: 2023-05-23 | Stop reason: HOSPADM

## 2023-05-22 RX ORDER — LORAZEPAM 0.5 MG/1
0.5 TABLET ORAL 3 TIMES DAILY
Status: DISCONTINUED | OUTPATIENT
Start: 2023-05-22 | End: 2023-05-23

## 2023-05-22 RX ADMIN — ENOXAPARIN SODIUM 40 MG: 100 INJECTION SUBCUTANEOUS at 08:12

## 2023-05-22 RX ADMIN — LORAZEPAM 0.5 MG: 0.5 TABLET ORAL at 09:14

## 2023-05-22 RX ADMIN — PANTOPRAZOLE SODIUM 40 MG: 40 TABLET, DELAYED RELEASE ORAL at 09:14

## 2023-05-22 RX ADMIN — HYDRALAZINE HYDROCHLORIDE 10 MG: 20 INJECTION, SOLUTION INTRAMUSCULAR; INTRAVENOUS at 12:36

## 2023-05-22 RX ADMIN — Medication 10 ML: at 21:26

## 2023-05-22 RX ADMIN — LORAZEPAM 0.5 MG: 0.5 TABLET ORAL at 15:52

## 2023-05-22 RX ADMIN — ASPIRIN 81 MG 81 MG: 81 TABLET ORAL at 08:12

## 2023-05-22 RX ADMIN — Medication 1000 UNITS: at 09:13

## 2023-05-22 RX ADMIN — CLOPIDOGREL BISULFATE 75 MG: 75 TABLET ORAL at 09:14

## 2023-05-22 RX ADMIN — HYDROCHLOROTHIAZIDE 25 MG: 25 TABLET ORAL at 09:14

## 2023-05-22 RX ADMIN — CETIRIZINE HYDROCHLORIDE 10 MG: 10 TABLET, FILM COATED ORAL at 09:13

## 2023-05-22 RX ADMIN — LORAZEPAM 0.5 MG: 0.5 TABLET ORAL at 21:26

## 2023-05-22 RX ADMIN — AMLODIPINE BESYLATE 10 MG: 5 TABLET ORAL at 09:13

## 2023-05-22 RX ADMIN — ATORVASTATIN CALCIUM 80 MG: 40 TABLET, FILM COATED ORAL at 21:26

## 2023-05-22 RX ADMIN — Medication 10 ML: at 08:13

## 2023-05-22 RX ADMIN — LOSARTAN POTASSIUM 100 MG: 50 TABLET, FILM COATED ORAL at 09:13

## 2023-05-22 NOTE — PLAN OF CARE
Goal Outcome Evaluation:            ASSESSMENT/ PLAN OF CARE:  At bedside, patient exhibits swallow function adequate for tolerance of regular solids and thin liquids.  Patient is performing compensatory strategies at an independent level.  At this time, patient does not require any further skilled speech pathology services.  If patient does demonstrate any decline in status, please reconsult speech pathology.    PROBLEMS:  1.  na   LTG 1: na   STG 1a: na     RECOMMENDATIONS:   1.   DIET: Regular solids, thin liquids    2.  POSITION: Fully upright for all p.o. intake, 30 minutes following

## 2023-05-22 NOTE — PAYOR COMM NOTE
"PATIENT INFORMATION  Name:  Hardy Rendon  MRN#:     [id number]  :  1975     INSURANCE MEMBER ID:     [id number]        ADMISSION INFORMATION  CLASS: Inpatient   DOS:  2023        CURRENT ATTENDING PROVIDER INFORMATION  Name/NPI Dedrick Valentin MD [[id number]]  Phone  Phone: [phone]        RENDERING FACILITY  Name:  [facility]   NPI:  [id number]  TID:  [id number]  Address:      [facility] [address] [place] [address]  Phone  [phone]        CASE MANAGEMENT CONTACT INFORMATION  Phone:      [phone]  Fax:           [phone]            HOSPITAL PROBLEM LIST and ADMISSION DIAGNOSES    Problems Addressed this Visit        Other    * (Principal) Left-sided weakness - Primary   Other Visit Diagnoses     Dysphagia, unspecified type        Difficulty in walking        Decreased activities of daily living (ADL)          Diagnoses       Codes Comments    Left-sided weakness    -  Primary ICD-10-CM: R53.1  ICD-9-CM: 728.87     Dysphagia, unspecified type     ICD-10-CM: R13.10  ICD-9-CM: 787.20     Difficulty in walking     ICD-10-CM: R26.2  ICD-9-CM: 719.7     Decreased activities of daily living (ADL)     ICD-10-CM: Z78.9  ICD-9-CM: V49.89                  Hardy Rendon (48 y.o. Male)     Date of Birth   1975    Social Security Number       Address   [address] [place] [address]    Home Phone   [phone]    MRN   [id number]       Holiness   Orthodoxy    Marital Status                               Admission Date   23    Admission Type   Emergency    Admitting Provider   Dedrick Valentin MD    Attending Provider   Dedrick Valentin MD    Department, Room/Bed   [facility] PROGRESSIVE CARE UNIT, 212/2       Discharge Date       Discharge Disposition       Discharge Destination                               Attending Provider: Dedrick Valentin MD    Allergies: Codeine    Isolation: None   Infection: None   Code Status: CPR    Ht: 172.7 cm (68\") "   Wt: 145 kg (320 lb)    Admission Cmt: None   Principal Problem: Left-sided weakness [R53.1]                 Active Insurance as of 2023     Primary Coverage     Payor Plan Insurance Group Employer/Plan Group    [employer] BLUE CROSS [employer] BLUE [employer]OSS BLUE [employer]IELD PPO [id number]     Payor Plan Address Payor Plan Phone Number Payor Plan Fax Number Effective Dates    [address] [phone]  2023 - None Entered    [place] [address]       Subscriber Name Subscriber Birth Date Member ID       МАРИЯ AMBROCIO 1975 [id number]                 Emergency Contacts      (Rel.) Home Phone Work Phone Mobile Phone    SILVINA AMBROCIO (Spouse) -- -- [phone]               History & Physical      Olvin Johnson MD at 23 1032           [facility] HISTORY AND PHYSICAL  Date: 2023   Patient Name: Мария Ambrocio  : 1975  MRN: [id number]  Primary Care Physician:  Provider, No Known  Date of admission: 2023    Subjective    Subjective     Chief Complaint: Left-sided numbness    HPI:    Мария Ambrocio is a 48 y.o. male with history of obstructive sleep apnea on CPAP at home, hypertension, intermittent explosive disorder, anxiety who had a fall in his shower this morning.  Patient says he vaguely remembers the fall.  During fall he hit the back side of his head.  Did not have a bruise and the area does not hurt.  Says he may have lost consciousness for few minutes.  He is not sure if he was sleepwalking when he fell.  Upon waking up he was feeling well and called his wife who suggested he goes to the ER.  Patient drove himself to the ER.  On his way he felt numbness in his left side of the face and arm.  He felt left lower extremity weakness as well.  Upon presentation to the ER, there was possible left-sided facial droop with left extremity weakness.  Stat neurology consult was placed.  Per neurology note TNK was administered due to risk outweigh benefit.  CTA head and neck  was negative for acute pathology.  During my encounter with the patient, patient did not have left-sided facial droop.  He had some left hand weakness and tingling in his hand.  He was able to lift his left lower extremity without any issues.      Personal History     Past Medical History:  Past Medical History:   Diagnosis Date   • Anxiety    • Hypertension    • Intermittent explosive disorder    • Sleep apnea          Past Surgical History:  Past Surgical History:   Procedure Laterality Date   • ESOPHAGEAL DILATATION     • JOINT REPLACEMENT           Family History:   History reviewed. No pertinent family history.      Social History:   Social History     Socioeconomic History   • Marital status:    Tobacco Use   • Smoking status: Never   • Smokeless tobacco: Never   Vaping Use   • Vaping Use: Never used   Substance and Sexual Activity   • Alcohol use: Never   • Drug use: Never         Home Medications:  Lactobacillus, amLODIPine, aspirin, atorvastatin, cholecalciferol, furosemide, hydrOXYzine, loratadine, meloxicam, metoprolol tartrate, omeprazole, and pregabalin    Allergies:  Allergies   Allergen Reactions   • Codeine Hives       Review of Systems   All systems were reviewed and negative except for: left hand weakness    Objective    Objective     Vitals:   Temp:  [98.7 °F (37.1 °C)] 98.7 °F (37.1 °C)  Heart Rate:  [51-79] 73  Resp:  [16] 16  BP: (147-176)/(73-98) 154/83    Physical Exam    Constitutional: Awake, alert, no acute distress   Eyes: Pupils equal, sclerae anicteric, no conjunctival injection   HENT: NCAT, mucous membranes moist   Neck: Supple, no thyromegaly, no lymphadenopathy, trachea midline   Respiratory: Clear to auscultation bilaterally, nonlabored respirations    Cardiovascular: RRR, no murmurs, rubs, or gallops, palpable pedal pulses bilaterally   Gastrointestinal: Positive bowel sounds, soft, nontender, nondistended   Musculoskeletal: No bilateral ankle edema, no clubbing or cyanosis  to extremities   Psychiatric: Appropriate affect, cooperative   Neurologic: Oriented x 3, decrease left hand  strength. speech clear, no facial droop   Skin: No rashes     Result Review    Result Review:  I have personally reviewed the results from the time of this admission to 5/21/2023 10:51 EDT and agree with these findings:  [x]  Laboratory  []  Microbiology  [x]  Radiology  [x]  EKG/Telemetry   [x]  Cardiology/Vascular   []  Pathology  []  Old records  []  Other:      Assessment & Plan   Assessment / Plan     Assessment/Plan:    Acute TIA   -No evidence of acute stroke / no acute pathology on CTA head and neck  -Aspirin MT started. Can switch to p.o. once cleared by SLP.  Start atorvastatin 80 mg at that time as well.  -Evaluated by neurology.  Not a candidate for TNK.  Neurology consulted.  -2D echo ordered.  -MRI brain ordered.  -Hold blood pressure medicines to allow for permissive hypertension  -telemetry bed   -check UDS    HTN  -Hold blood pressure medicines to allow for permissive hypertension  -Restart blood pressure medicines once cleared by neurology    JACOB   -Continue home CPAP    Intermittent explosive disorder  -Currently stable.  -Follows outpatient with psychiatrist.  Has been recently tried on various medications but was not able to tolerate any of them.    Elevated ALT/ AST   -mildly elevated   -can follow up outpatient with pcp for further work up  -monitor closely if atorvastatin is started      DVT prophylaxis:  Medical and mechanical DVT prophylaxis orders are present.    CODE STATUS:    Code Status (Patient has no pulse and is not breathing): CPR (Attempt to Resuscitate)  Medical Interventions (Patient has pulse or is breathing): Full Support  Release to patient: Routine Release      Admission Status:  I believe this patient meets inpatient status.    Electronically signed by Olvin Johnson MD, 05/21/23, 10:32 AM EDT.               Electronically signed by Olvin Johnson MD at  "05/21/23 1057          Emergency Department Notes      Esha Arreola RN at 05/21/23 0631        Pt states that he has been having \"episodes like this for 1 month\" pt has facial drooping and weakness to L side.     Electronically signed by Esha Arreola RN at 05/21/23 0631     Denisse Alarcon, RN at 05/21/23 0849        AS soon at patient falls asleep his O2 sat drop  Pt states he has sleep apnea and uses a CPAP at home   MD aware     Electronically signed by Denisse Alarcon RN at 05/21/23 0849     Alfredo Aguilar MD at 05/21/23 1013          Time: 10:13 AM EDT  Date of encounter:  5/21/2023  Independent Historian/Clinical History and Information was obtained by:   Patient  Chief Complaint: Left-sided weakness    History is limited by: N/A    History of Present Illness:  Patient is a 48 y.o. year old male who presents to the emergency department for evaluation of left-sided weakness.  Patient reports left facial weakness, left upper extremity weakness but that the symptoms have been coming and going for couple days as well.  Denies headache.    HPI    Patient Care Team  Primary Care Provider: Provider, No Known    Past Medical History:     Allergies   Allergen Reactions   • Codeine Hives     Past Medical History:   Diagnosis Date   • Anxiety    • Hypertension    • Intermittent explosive disorder    • Sleep apnea      Past Surgical History:   Procedure Laterality Date   • ESOPHAGEAL DILATATION       History reviewed. No pertinent family history.    Home Medications:  Prior to Admission medications    Medication Sig Start Date End Date Taking? Authorizing Provider   amLODIPine (NORVASC) 5 MG tablet Take 1 tablet by mouth Daily.    ProviderBaldomero MD   aspirin 81 MG chewable tablet Chew 1 tablet Daily.    ProviderBaldomero MD   atorvastatin (LIPITOR) 80 MG tablet Take 1 tablet by mouth Daily.    ProviderBaldomero MD   Cholecalciferol 25 MCG (1000 UT) tablet Take 1 tablet by " "mouth Daily.    Baldomero Bardales MD   furosemide (LASIX) 20 MG tablet Take 1 tablet by mouth Daily.    Baldomero Bardales MD   hydrOXYzine (ATARAX) 25 MG tablet Take 1 tablet by mouth At Night As Needed (sleep). 5/14/23   Cristiano Valenzuela MD   LACTOBACILLUS PROBIOTIC PO Take  by mouth.    Baldomero Bardales MD   loratadine (CLARITIN) 10 MG tablet Take 1 tablet by mouth Daily.    Baldomero Bardales MD   meloxicam (MOBIC) 7.5 MG tablet Take 1 tablet by mouth 2 (Two) Times a Day.    Baldomero Bardales MD   metoprolol tartrate (LOPRESSOR) 50 MG tablet Take 1 tablet by mouth 2 (Two) Times a Day.    Baldomero Bardales MD   omeprazole (priLOSEC) 40 MG capsule Take 1 capsule by mouth Daily.    Baldomero Bardales MD   pregabalin (LYRICA) 75 MG capsule Take 1 capsule by mouth 3 (Three) Times a Day.    Baldomero Bardales MD        Social History:   Social History     Tobacco Use   • Smoking status: Never   • Smokeless tobacco: Never   Vaping Use   • Vaping Use: Never used   Substance Use Topics   • Alcohol use: Never   • Drug use: Never         Review of Systems:  Review of Systems   Constitutional: Negative for chills and fever.   HENT: Negative for congestion, rhinorrhea and sore throat.    Eyes: Negative for pain and visual disturbance.   Respiratory: Negative for apnea, cough, chest tightness and shortness of breath.    Cardiovascular: Negative for chest pain and palpitations.   Gastrointestinal: Negative for abdominal pain, diarrhea, nausea and vomiting.   Genitourinary: Negative for difficulty urinating and dysuria.   Musculoskeletal: Negative for joint swelling and myalgias.   Skin: Negative for color change.   Neurological: Negative for seizures and headaches.   Psychiatric/Behavioral: Negative.    All other systems reviewed and are negative.       Physical Exam:  /83   Pulse 73   Temp 98.7 °F (37.1 °C)   Resp 16   Ht 172.7 cm (68\")   Wt (!) 145 kg (320 lb)   SpO2 (!) 87%   BMI " 48.66 kg/m²     Physical Exam  Vitals and nursing note reviewed.   Constitutional:       General: He is not in acute distress.     Appearance: Normal appearance. He is not toxic-appearing.   HENT:      Head: Normocephalic and atraumatic.      Jaw: There is normal jaw occlusion.   Eyes:      General: Lids are normal.      Extraocular Movements: Extraocular movements intact.      Conjunctiva/sclera: Conjunctivae normal.      Pupils: Pupils are equal, round, and reactive to light.   Cardiovascular:      Rate and Rhythm: Normal rate and regular rhythm.      Pulses: Normal pulses.      Heart sounds: Normal heart sounds.   Pulmonary:      Effort: Pulmonary effort is normal. No respiratory distress.      Breath sounds: Normal breath sounds. No wheezing or rhonchi.   Abdominal:      General: Abdomen is flat.      Palpations: Abdomen is soft.      Tenderness: There is no abdominal tenderness. There is no guarding or rebound.   Musculoskeletal:         General: Normal range of motion.      Cervical back: Normal range of motion and neck supple.      Right lower leg: No edema.      Left lower leg: No edema.   Skin:     General: Skin is warm and dry.   Neurological:      Mental Status: He is alert and oriented to person, place, and time.      Comments: Left facial weakness, no pronator drift of the left upper extremity but decreased  strength of the left hand.   Psychiatric:         Mood and Affect: Mood normal.                 Procedures:  Procedures      Medical Decision Making:      Comorbidities that affect care:    Hypertension, Obesity    External Notes reviewed:    None      The following orders were placed and all results were independently analyzed by me:  Orders Placed This Encounter   Procedures   • CT Head Without Contrast Stroke Protocol   • CT Angiogram Head w AI Analysis of LVO   • CT Angiogram Neck   • CT CEREBRAL PERFUSION WITH & WITHOUT CONTRAST   • XR Chest 1 View   • MRI Brain Without Contrast   • Hiram  Draw   • Comprehensive Metabolic Panel   • Protime-INR   • aPTT   • Single High Sensitivity Troponin T   • Urinalysis With Microscopic If Indicated (No Culture) - Urine, Clean Catch   • CBC Auto Differential   • Hemoglobin A1c   • Lipid Panel   • CBC (No Diff)   • Comprehensive Metabolic Panel   • Urine Drug Screen - Urine, Clean Catch   • NPO Diet NPO Type: Strict NPO   • Initiate Department's Acute Stroke Process (Team D, Code 19, etc)   • Perform NIH Stroke Scale   • Measure Actual Weight   • Head of Bed 30 Degrees or Less   • Undress and Gown   • Vital Signs   • Neuro Checks   • Notify MD for SBP < 80 or > 200   • Notify Provider for SBP greater than 140 if hemorrhagic Stroke   • No Hypotonic Fluids   • Nursing Swallow Assessment   • Vital Signs   • Pulse Oximetry, Continuous   • Telemetry - Place Orders & Notify Provider of Results When Patient Experiences Acute Chest Pain, Dysrhythmia or Respiratory Distress   • Notify Provider   • Nursing Dysphagia Screening (Complete Prior to Giving Anything By Mouth)   • RN to Place Order SLP Consult - Eval & Treat Choosing Reason of RN Dysphagia Screen Failed   • Nurse to Call MD or Nutrition Services for Diet if Patient Passes Dysphagia Screen   • Intake and Output   • Neuro Checks   • NIHSS Assessment   • Order CT Head Without Contrast for Neurological Decline   • Provide Stroke Education Material   • Saline Lock & Maintain IV Access   • Place Sequential Compression Device   • Maintain Sequential Compression Device   • Activity - Strict Bed Rest with HOB Flat   • Code Status and Medical Interventions:   • Inpatient Hospitalist Consult   • Inpatient Case Management  Consult   • Inpatient Diabetes Educator Consult   • Inpatient Neurology Consult Stroke   • OT Consult: Eval & Treat   • PT Consult: Eval & Treat As Tolerated   • Oxygen Therapy- Nasal Cannula; Titrate 1-6 LPM Per SpO2; 90 - 95%   • NIPPV (CPAP or BIPAP)   • SLP Consult: Eval & Treat  Communication Disorder   • SLP Consult: Eval & Treat RN Dysphagia Screen Failed   • POC Glucose Once   • POC Glucose Q6H   • POC Glucose Once   • ECG 12 Lead ED Triage Standing Order; Acute Stroke (Onset <12 hrs)   • ECG 12 Lead Stroke Evaluation   • Adult Transthoracic Echo Complete W/ Cont if Necessary Per Protocol (With Agitated Saline)   • Type & Screen   • ABO RH Specimen Verification   • Insert Large Bore Peripheral IV - Right AC Preferred   • Insert Peripheral IV   • Inpatient Admission   • CBC & Differential   • Green Top (Gel)   • Lavender Top   • Gold Top - SST   • Light Blue Top       Medications Given in the Emergency Department:  Medications   sodium chloride 0.9 % flush 10 mL (has no administration in time range)   nitroglycerin (NITROSTAT) SL tablet 0.4 mg (has no administration in time range)   sodium chloride 0.9 % flush 10 mL (10 mL Intravenous Given 5/21/23 1333)   sodium chloride 0.9 % flush 10 mL (has no administration in time range)   sodium chloride 0.9 % infusion 40 mL (has no administration in time range)   aspirin chewable tablet 81 mg (81 mg Oral Not Given 5/21/23 1336)     Or   aspirin suppository 300 mg ( Rectal Not Given:  See Alt 5/21/23 1336)   acetaminophen (TYLENOL) tablet 650 mg (has no administration in time range)     Or   acetaminophen (TYLENOL) suppository 650 mg (has no administration in time range)   Enoxaparin Sodium (LOVENOX) syringe 40 mg (40 mg Subcutaneous Given 5/21/23 1332)   LORazepam (ATIVAN) injection 1 mg (has no administration in time range)   iopamidol (ISOVUE-370) 76 % injection 150 mL (150 mL Intravenous Given 5/21/23 0812)        ED Course:         Labs:    Lab Results (last 24 hours)     Procedure Component Value Units Date/Time    Comprehensive Metabolic Panel [[id number]]  (Abnormal) Collected: 05/21/23 0640    Specimen: Blood Updated: 05/21/23 0704     Glucose 122 mg/dL      BUN 19 mg/dL      Creatinine 0.87 mg/dL      Sodium 141 mmol/L      Potassium 3.6  mmol/L      Chloride 101 mmol/L      CO2 30.4 mmol/L      Calcium 9.1 mg/dL      Total Protein 7.3 g/dL      Albumin 4.3 g/dL      ALT (SGPT) 66 U/L      AST (SGOT) 45 U/L      Alkaline Phosphatase 106 U/L      Total Bilirubin 0.4 mg/dL      Globulin 3.0 gm/dL      A/G Ratio 1.4 g/dL      BUN/Creatinine Ratio 21.8     Anion Gap 9.6 mmol/L      eGFR 107.8 mL/min/1.73     Narrative:      GFR Normal >60  Chronic Kidney Disease <60  Kidney Failure <15      Single High Sensitivity Troponin T [[id number]]  (Normal) Collected: 05/21/23 0640    Specimen: Blood Updated: 05/21/23 0704     HS Troponin T 10 ng/L     Narrative:      High Sensitive Troponin T Reference Range:  <10.0 ng/L- Negative Female for AMI  <15.0 ng/L- Negative Male for AMI  >=10 - Abnormal Female indicating possible myocardial injury.  >=15 - Abnormal Male indicating possible myocardial injury.   Clinicians would have to utilize clinical acumen, EKG, Troponin, and serial changes to determine if it is an Acute Myocardial Infarction or myocardial injury due to an underlying chronic condition.         CBC & Differential [[id number]]  (Normal) Collected: 05/21/23 0641    Specimen: Blood Updated: 05/21/23 0645    Narrative:      The following orders were created for panel order CBC & Differential.  Procedure                               Abnormality         Status                     ---------                               -----------         ------                     CBC Auto Differential[[id number]]        Normal              Final result                 Please view results for these tests on the individual orders.    Protime-INR [[id number]]  (Normal) Collected: 05/21/23 0641    Specimen: Blood Updated: 05/21/23 0653     Protime 13.9 Seconds      INR 1.06    Narrative:      Suggested Therapeutic Ranges For Oral Anticoagulant Therapy:  Level of Therapy                      INR Target Range  Standard Dose                            2.0-3.0  High Dose                                 2.5-3.5  Patients not receiving anticoagulant  Therapy Normal Range                     0.86-1.15    aPTT [[id number]]  (Normal) Collected: 05/21/23 0641    Specimen: Blood Updated: 05/21/23 0654     PTT 29.8 seconds     CBC Auto Differential [[id number]]  (Normal) Collected: 05/21/23 0641    Specimen: Blood Updated: 05/21/23 0645     WBC 6.74 10*3/mm3      RBC 4.23 10*6/mm3      Hemoglobin 13.3 g/dL      Hematocrit 39.6 %      MCV 93.6 fL      MCH 31.4 pg      MCHC 33.6 g/dL      RDW 13.9 %      RDW-SD 47.2 fl      MPV 10.3 fL      Platelets 191 10*3/mm3      Neutrophil % 57.7 %      Lymphocyte % 29.7 %      Monocyte % 8.8 %      Eosinophil % 3.0 %      Basophil % 0.4 %      Immature Grans % 0.4 %      Neutrophils, Absolute 3.89 10*3/mm3      Lymphocytes, Absolute 2.00 10*3/mm3      Monocytes, Absolute 0.59 10*3/mm3      Eosinophils, Absolute 0.20 10*3/mm3      Basophils, Absolute 0.03 10*3/mm3      Immature Grans, Absolute 0.03 10*3/mm3      nRBC 0.0 /100 WBC     Urinalysis With Microscopic If Indicated (No Culture) - Urine, Clean Catch [[id number]]  (Normal) Collected: 05/21/23 0802    Specimen: Urine, Clean Catch Updated: 05/21/23 0811     Color, UA Yellow     Appearance, UA Clear     pH, UA 7.0     Specific Gravity, UA 1.021     Glucose, UA Negative     Ketones, UA Negative     Bilirubin, UA Negative     Blood, UA Negative     Protein, UA Negative     Leuk Esterase, UA Negative     Nitrite, UA Negative     Urobilinogen, UA 1.0 E.U./dL    Narrative:      Urine microscopic not indicated.    Urine Drug Screen - Urine, Clean Catch [[id number]]  (Normal) Collected: 05/21/23 0802    Specimen: Urine, Clean Catch Updated: 05/21/23 1244     Amphet/Methamphet, Screen Negative     Barbiturates Screen, Urine Negative     Benzodiazepine Screen, Urine Negative     Cocaine Screen, Urine Negative     Opiate Screen Negative     THC, Screen, Urine Negative     Methadone Screen, Urine Negative     Oxycodone Screen,  Urine Negative     Fentanyl, Urine Negative    Narrative:      Negative Thresholds Per Drugs Screened:    Amphetamines                 500 ng/ml  Barbiturates                 200 ng/ml  Benzodiazepines              100 ng/ml  Cocaine                      300 ng/ml  Methadone                    300 ng/ml  Opiates                      300 ng/ml  Oxycodone                    100 ng/ml  THC                           50 ng/ml  Fentanyl                       5 ng/ml      The Normal Value for all drugs tested is negative. This report includes final unconfirmed screening results to be used for medical treatment purposes only. Unconfirmed results must not be used for non-medical purposes such as employment or legal testing. Clinical consideration should be applied to any drug of abuse test, particularly when unconfirmed results are used.            POC Glucose Once [[id number]]  (Normal) Collected: 05/21/23 1139    Specimen: Blood Updated: 05/21/23 1141     Glucose 92 mg/dL      Comment: Serial Number: [id number]:  [id number]              Imaging:    CT Angiogram Neck    Result Date: 5/21/2023  PROCEDURE: CT ANGIOGRAM NECK  COMPARISON: [facility], CT, CT HEAD WO CONTRAST STROKE PROTOCOL, 5/21/2023, 6:34.  INDICATIONS: Neuro deficit, acute, stroke suspected  PROTOCOL:   Standard imaging protocol performed    RADIATION:   DLP: 945.1 mGy*cm   Automated exposure control was utilized to minimize radiation dose. CONTRAST: 75 cc Isovue 370 I.V.  TECHNIQUE: After obtaining the patient's consent, CT images of the neck were obtained without and with non-ionic intravenous contrast material. Multi-planar reformatted/3-D images were created to optimize visualization of vascular anatomy. Unless otherwise stated in this report, all vascular stenoses involving the internal carotid arteries reported for this examination are derived by dividing the lesion diameter by the diameter of the normal internal carotid artery more  distally.  FINDINGS:  LEFT INTERNAL CAROTID: No hemodynamically significant stenosis or dissection.  EXTERNAL CAROTID: No hemodynamically significant stenosis or dissection.  COMMON CAROTID: No hemodynamically significant stenosis or dissection.  VERTEBRAL: No hemodynamically significant stenosis or dissection.   RIGHT INTERNAL CAROTID: No hemodynamically significant stenosis or dissection.  EXTERNAL CAROTID: No hemodynamically significant stenosis or dissection.  COMMON CAROTID: No hemodynamically significant stenosis or dissection.  VERTEBRAL: No hemodynamically significant stenosis or dissection.   OTHER: The visualized soft tissues of the neck are also unremarkable.  Degenerative changes are present in the cervical spine.       Normal CT angiogram of the neck.      NICANOR LINDO MD       Electronically Signed and Approved By: NICANOR LINDO MD on 5/21/2023 at 9:46             XR Chest 1 View    Result Date: 5/21/2023  PROCEDURE: XR CHEST 1 VW  COMPARISON: None  INDICATIONS: Acute Stroke Protocol (Onset < 12 hrs)  FINDINGS:   The lungs are well-expanded. The heart and pulmonary vasculature are within normal limits. No pleural effusions are identified. There are no active appearing infiltrates.  IMPRESSION: No active disease.  NICANOR LINDO MD       Electronically Signed and Approved By: NICANOR LINDO MD on 5/21/2023 at 7:37             CT Head Without Contrast Stroke Protocol    Result Date: 5/21/2023  PROCEDURE: CT HEAD WO CONTRAST STROKE PROTOCOL  COMPARISON:  None INDICATIONS: Neuro deficit, acute, stroke suspected  PROTOCOL:   Standard imaging protocol performed    RADIATION:   DLP: 954.5 mGy*cm   MA and/or KV was adjusted to minimize radiation dose.     TECHNIQUE: Axial images of the head without intravenous contrast.  FINDINGS:  The ventricles have a normal size and configuration. There is no evidence of acute intracranial hemorrhage, mass or midline shift. No extra-axial fluid collections are  identified. There are no skull fractures. The visualized paranasal sinuses and mastoid air cells are grossly clear.  IMPRESSION: Normal exam.  NICANOR LINDO MD       Electronically Signed and Approved By: NICANOR LINDO MD on 5/21/2023 at 6:43             CT Angiogram Head w AI Analysis of LVO    Result Date: 5/21/2023  PROCEDURE: CT ANGIOGRAM HEAD W AI ANALYSIS OF LVO  COMPARISON: [facility], CT, CT HEAD WO CONTRAST STROKE PROTOCOL, 5/21/2023, 6:34.  INDICATIONS: Neuro deficit, acute, stroke suspected  PROTOCOL:   Standard imaging protocol performed    RADIATION:   DLP: 945.1 mGy*cm   Automated exposure control was utilized to minimize radiation dose. CONTRAST: 75 cc Isovue 370 I.V.  TECHNIQUE: After obtaining the patient's consent, CT images of the head were obtained without and with non-ionic contrast, and multi-planar/3-D imaging were created and interpreted to optimize visualization of vascular anatomy.   FINDINGS:   The intracranial portion of the internal carotid arteries, major MCA and MARLENY branches are patent.  The distal vertebral arteries, basilar artery and posterior cerebral arteries are patent.  No evidence of large vessel occlusion, high-grade stenosis, dissection or aneurysm.  There is mild polypoid mucosal thickening in the maxillary sinuses.  IMPRESSION: Normal CT angiogram of the head.   NICANOR LINDO MD       Electronically Signed and Approved By: NICANOR LINDO MD on 5/21/2023 at 9:48             CT CEREBRAL PERFUSION WITH & WITHOUT CONTRAST    Result Date: 5/21/2023  PROCEDURE: CT CEREBRAL PERFUSION W WO CONTRAST  COMPARISON: [facility], CT, CT HEAD WO CONTRAST STROKE PROTOCOL, 5/21/2023, 6:34.  INDICATIONS: Neuro deficit, acute, stroke suspected  PROTOCOL:   Standard imaging protocol performed    RADIATION:   DLP: 1301.4 mGy*cm   Automated exposure control was utilized to minimize radiation dose. CONTRAST: 80 cc Isovue 370 I.V.   FINDINGS: No CT perfusion  defects are identified.  CBF less than 30% 0 mL.  T-max greater than 6 seconds 0 mL.       Normal examination.       NICANOR LINDO MD       Electronically Signed and Approved By: NICANOR LINDO MD on 5/21/2023 at 8:29                 Differential Diagnosis and Discussion:    Weakness: Based on the patient's history, signs, and symptoms, the diffential diagnosis includes but is not limited to meningitis, stroke, sepsis, subarachnoid hemorrhage, intracranial bleeding, encephalitis, acute uti, dehydration, MS, myasthenia gravis, Guillan [place], migraine variant, neuromuscular disorders vertigo, electrolyte imbalance, and metabolic disorders.    All labs were reviewed and interpreted by me.  All X-rays impressions were independently interpreted by me.  CT scan radiology impression was interpreted by me.    MDM  Number of Diagnoses or Management Options  Left-sided weakness  Diagnosis management comments: In summary this is a 48-year-old male who presents to the emergency department for evaluation of strokelike symptoms.  On examination he has have weakness of the left face and weakness of the left hand.  He has been evaluated by teleneurology and recommended for inpatient admission and further work-up of the stroke symptoms.  He is not a candidate for thrombolytics at this time.  CT brain, CT perfusion, CTA head neck unremarkable for acute pathology.  CBC independently reviewed by me and shows no critical abnormalities.  CMP independently reviewed by me and shows no critical abnormalities.  Patient case has been discussed with the hospitalist team who will admit to the hospital for further evaluation and continuation of treatment.           Patient Care Considerations:    MRI: I considered ordering an MRI however This can be done inpatient.      Consultants/Shared Management Plan:    Hospitalist: I have discussed the case with Dr. Johnson who agrees to accept the patient for admission.  Consultant: I have discussed  the case with Teleneurology who agrees to consult on the patient.    Social Determinants of Health:    Patient is independent, reliable, and has access to care.       Disposition and Care Coordination:    Admit:   Through independent evaluation of the patient's history, physical, and imperical data, the patient meets criteria for observation/admission to the hospital.        Final diagnoses:   Left-sided weakness        ED Disposition     ED Disposition   Decision to Admit    Condition   --    Comment   Level of Care: Telemetry [5]   Diagnosis: Left-sided weakness [361795]   Admitting Physician: DHAVAL NEUMANN [[id number]]   Certification: I Certify That Inpatient Hospital Services Are Medically Necessary For Greater Than 2 Midnights               This medical record created using voice recognition software.           Alfredo Aguilar MD  05/21/23 1420      Electronically signed by Alfredo Aguilar MD at 05/21/23 1420       Vital Signs (last day)     Date/Time Temp Temp src Pulse Resp BP Patient Position SpO2    05/22/23 1341 -- -- 67 -- 166/75 Sitting --    05/22/23 1103 98.1 (36.7) Oral 80 18 184/85 Sitting 97    05/22/23 0742 -- -- 64 -- -- -- 100    05/22/23 0712 98.1 (36.7) Oral 76 20 163/98 Lying 100    05/22/23 0302 98.2 (36.8) Axillary 80 12 154/79 Lying 99    05/21/23 2326 -- -- 78 12 -- -- 96    05/21/23 2310 98 (36.7) Oral 72 20 186/93 Sitting 96    05/21/23 2000 -- -- 81 20 177/99 Sitting 94    05/21/23 1936 98.1 (36.7) Axillary 80 16 210/99 Lying 90    05/21/23 1933 -- -- 67 16 -- -- 90    05/21/23 1841 -- -- -- -- 210/99 Lying --    05/21/23 1655 98.1 (36.7) Oral 76 16 176/131 Sitting 92    05/21/23 1030 -- -- 73 -- 154/83 -- 87    05/21/23 1015 -- -- 79 -- -- -- 99    05/21/23 1013 -- -- -- -- -- -- 97    05/21/23 1000 -- -- 58 -- 162/87 -- --    05/21/23 0915 -- -- 54 -- -- -- 86    05/21/23 0900 -- -- 57 -- 157/87 -- --    05/21/23 0830 -- -- 51 -- 155/86 -- 82    05/21/23 0745 -- -- 63 -- 158/86 -- 90     05/21/23 0700 -- -- 66 -- 176/98 -- 93    05/21/23 0652 98.7 (37.1) -- -- -- -- -- --    05/21/23 0627 -- -- 70 16 147/73 Sitting 100          Oxygen Therapy (last day)     Date/Time SpO2 Device (Oxygen Therapy) Flow (L/min) Oxygen Concentration (%) ETCO2 (mmHg)    05/22/23 1103 97 room air -- -- --    05/22/23 0742 100 nasal cannula 3 -- --    05/22/23 0736 -- CPAP 3 -- --    05/22/23 0712 100 room air -- -- --    05/22/23 0302 99 CPAP -- 30 --    05/21/23 2326 96 CPAP -- 30 --    05/21/23 2310 96 nasal cannula 3 -- --    05/21/23 2000 94 nasal cannula 3 -- --    05/21/23 1936 90 NPPV/NIV -- 29 --    05/21/23 1933 90 CPAP -- 30 --    05/21/23 1710 -- -- 3 30 --    05/21/23 1655 92 nasal cannula -- -- --    05/21/23 1200 -- room air -- -- --    05/21/23 1030 87 -- -- -- --    05/21/23 1015 99 -- -- -- --    05/21/23 1013 97 -- -- -- --    05/21/23 0915 86 -- -- -- --    05/21/23 0830 82 -- -- -- --    05/21/23 0745 90 -- -- -- --    05/21/23 0700 93 -- -- -- --    05/21/23 0627 100 room air -- -- --            Facility-Administered Medications as of 5/22/2023   Medication Dose Route Frequency Provider Last Rate Last Admin   • acetaminophen (TYLENOL) tablet 650 mg  650 mg Oral Q4H PRN Olvin Johnson MD        Or   • acetaminophen (TYLENOL) suppository 650 mg  650 mg Rectal Q4H PRN Olvin Johnson MD       • amLODIPine (NORVASC) tablet 10 mg  10 mg Oral Daily Dedrick Valentin MD   10 mg at 05/22/23 0913   • aspirin chewable tablet 81 mg  81 mg Oral Daily Olvin Johnson MD   81 mg at 05/22/23 0812    Or   • aspirin suppository 300 mg  300 mg Rectal Daily Ovlin Johnson MD       • atorvastatin (LIPITOR) tablet 80 mg  80 mg Oral Nightly Dedrick Valentin MD       • cetirizine (zyrTEC) tablet 10 mg  10 mg Oral Daily Dedrick Valentin MD   10 mg at 05/22/23 0913   • cholecalciferol (VITAMIN D3) tablet 1,000 Units  1,000 Units Oral Daily Dedrick Valentin MD   1,000 Units at 05/22/23 0913   • clopidogrel  (PLAVIX) tablet 75 mg  75 mg Oral Daily Dedrick Valentin MD   75 mg at 05/22/23 0914   • Enoxaparin Sodium (LOVENOX) syringe 40 mg  40 mg Subcutaneous Daily Olvin Johnson MD   40 mg at 05/22/23 0812   • hydrALAZINE (APRESOLINE) injection 10 mg  10 mg Intravenous Q6H PRN Dedrick Valentin MD   10 mg at 05/22/23 1236   • losartan (COZAAR) tablet 100 mg  100 mg Oral Q24H Dedrick Valentin MD   100 mg at 05/22/23 0913    And   • hydroCHLOROthiazide (HYDRODIURIL) tablet 25 mg  25 mg Oral Q24H Dedrick Valentin MD   25 mg at 05/22/23 0914   • [COMPLETED] iopamidol (ISOVUE-370) 76 % injection 150 mL  150 mL Intravenous Once in imaging Alfredo Aguilar MD   150 mL at 05/21/23 0812   • [COMPLETED] labetalol (NORMODYNE,TRANDATE) injection 5 mg  5 mg Intravenous Once Olvin Johnson MD   5 mg at 05/21/23 1939   • [COMPLETED] LORazepam (ATIVAN) injection 1 mg  1 mg Intravenous Once Olvin Johnson MD   1 mg at 05/21/23 1538   • LORazepam (ATIVAN) tablet 0.5 mg  0.5 mg Oral TID Dedrick Valentin MD   0.5 mg at 05/22/23 0914   • nitroglycerin (NITROSTAT) SL tablet 0.4 mg  0.4 mg Sublingual Q5 Min PRN Olvin Johnson MD       • pantoprazole (PROTONIX) EC tablet 40 mg  40 mg Oral Q AM Dedrick Valentin MD   40 mg at 05/22/23 0914   • sodium chloride 0.9 % flush 10 mL  10 mL Intravenous PRN Olvin Jhonson MD       • sodium chloride 0.9 % flush 10 mL  10 mL Intravenous Q12H Olvin Johnson MD   10 mL at 05/22/23 0813   • sodium chloride 0.9 % flush 10 mL  10 mL Intravenous PRN Olvin Johnson MD       • sodium chloride 0.9 % infusion 40 mL  40 mL Intravenous PRN Olvin Johnson MD       • [COMPLETED] Sulfur Hexafluoride Microsph (LUMASON) 60.7-25 MG IV reconstituted suspension reconstituted suspension 3 mL  3 mL Injection Once Olvin Johnson MD   3 mL at 05/21/23 1435       Lab Results (last 24 hours)     Procedure Component Value Units Date/Time    POC Glucose Once [[id number]]  (Normal) Collected: 05/22/23  1223    Specimen: Blood Updated: 05/22/23 1226     Glucose 95 mg/dL      Comment: Serial Number: [id number]:  [id number]       Hemoglobin A1c [[id number]]  (Abnormal) Collected: 05/22/23 0533    Specimen: Blood Updated: 05/22/23 1011     Hemoglobin A1C 5.90 %     Narrative:      Hemoglobin A1C Ranges:    Increased Risk for Diabetes  5.7% to 6.4%  Diabetes                     >= 6.5%  Diabetic Goal                < 7.0%    POC Glucose Once [[id number]]  (Normal) Collected: 05/22/23 0743    Specimen: Blood Updated: 05/22/23 0801     Glucose 91 mg/dL      Comment: Serial Number: [id number]:  [id number]       Lipid Panel [[id number]]  (Abnormal) Collected: 05/22/23 0533    Specimen: Blood Updated: 05/22/23 0612     Total Cholesterol 139 mg/dL      Triglycerides 149 mg/dL      HDL Cholesterol 32 mg/dL      LDL Cholesterol  81 mg/dL      VLDL Cholesterol 26 mg/dL      LDL/HDL Ratio 2.41    Narrative:      Cholesterol Reference Ranges  (U.S. Department of Health and Human Services ATP III Classifications)    Desirable          <200 mg/dL  Borderline High    200-239 mg/dL  High Risk          >240 mg/dL      Triglyceride Reference Ranges  (U.S. Department of Health and Human Services ATP III Classifications)    Normal           <150 mg/dL  Borderline High  150-199 mg/dL  High             200-499 mg/dL  Very High        >500 mg/dL    HDL Reference Ranges  (U.S. Department of Health and Human Services ATP III Classifications)    Low     <40 mg/dl (major risk factor for CHD)  High    >60 mg/dl ('negative' risk factor for CHD)        LDL Reference Ranges  (U.S. Department of Health and Human Services ATP III Classifications)    Optimal          <100 mg/dL  Near Optimal     100-129 mg/dL  Borderline High  130-159 mg/dL  High             160-189 mg/dL  Very High        >189 mg/dL    Comprehensive Metabolic Panel [[id number]]  (Abnormal) Collected: 05/22/23 0533    Specimen: Blood Updated: 05/22/23 0612     Glucose 113  mg/dL      BUN 16 mg/dL      Creatinine 0.81 mg/dL      Sodium 141 mmol/L      Potassium 3.7 mmol/L      Comment: Slight hemolysis detected by analyzer. Results may be affected.        Chloride 102 mmol/L      CO2 30.3 mmol/L      Calcium 9.1 mg/dL      Total Protein 7.2 g/dL      Albumin 4.3 g/dL      ALT (SGPT) 59 U/L      AST (SGOT) 37 U/L      Alkaline Phosphatase 103 U/L      Total Bilirubin 0.8 mg/dL      Globulin 2.9 gm/dL      A/G Ratio 1.5 g/dL      BUN/Creatinine Ratio 19.8     Anion Gap 8.7 mmol/L      eGFR 110.1 mL/min/1.73     Narrative:      GFR Normal >60  Chronic Kidney Disease <60  Kidney Failure <15      CBC (No Diff) [[id number]]  (Normal) Collected: 05/22/23 0533    Specimen: Blood Updated: 05/22/23 0604     WBC 5.99 10*3/mm3      RBC 4.18 10*6/mm3      Hemoglobin 13.4 g/dL      Hematocrit 40.0 %      MCV 95.7 fL      MCH 32.1 pg      MCHC 33.5 g/dL      RDW 13.7 %      RDW-SD 47.6 fl      MPV 10.2 fL      Platelets 191 10*3/mm3     POC Glucose Once [[id number]]  (Normal) Collected: 05/21/23 1858    Specimen: Blood Updated: 05/21/23 1859     Glucose 83 mg/dL      Comment: Serial Number: [id number]:  [id number]           Imaging Results (Last 24 Hours)     Procedure Component Value Units Date/Time    MRI Brain Without Contrast [[id number]] Collected: 05/21/23 1642     Updated: 05/21/23 1645    Narrative:      PROCEDURE: MRI BRAIN WO CONTRAST     COMPARISON: [facility], CT, CT HEAD WO CONTRAST STROKE PROTOCOL, 5/21/2023, 6:34.     INDICATIONS: syncope, hallucinations, sleep walking, left upper extremity paresthesia             TECHNIQUE: Multiplanar multisequence images of the brain without intravenous gadolinium.     FINDINGS:  Motion artifact is present.  No evidence of acute ischemia on diffusion-weighted images.  The   ventricles have a normal size and configuration.  No evidence of acute intracranial hemorrhage,   mass or midline shift.  There are chronic appearing changes in  the white matter.  There is mild   mucosal thickening in the maxillary sinuses.  No extra-axial fluid collections are identified.  No   sellar or parasellar masses are identified.     IMPRESSION:  No acute intracranial abnormalities are identified.     NICANOR LINDO MD         Electronically Signed and Approved By: NICANOR LINDO MD on 5/21/2023 at 16:42                         ECG/EMG Results (last 24 hours)     Procedure Component Value Units Date/Time    Adult Transthoracic Echo Complete W/ Cont if Necessary Per Protocol (With Agitated Saline) [[id number]] Resulted: 05/21/23 1520     Updated: 05/21/23 1522     Target HR (85%) 148 bpm      Max. Pred. HR (100%) 174 bpm      EF(MOD-bp) 61.5 %      LVIDd 4.8 cm      LVIDs 3.3 cm      IVSd 1.19 cm      LVPWd 1.12 cm      FS 30.1 %      IVS/LVPW 1.06 cm      ESV(cubed) 37.3 ml      LV Sys Vol (BSA corrected) 21.9 cm2      EDV(cubed) 109.2 ml      LV Torres Vol (BSA corrected) 68.1 cm2      LVOT area 4.2 cm2      LV mass(C)d 206.3 grams      LVOT diam 2.30 cm      EDV(MOD-sp2) 126.0 ml      EDV(MOD-sp4) 170.0 ml      ESV(MOD-sp2) 60.0 ml      ESV(MOD-sp4) 54.7 ml      SV(MOD-sp2) 66.0 ml      SV(MOD-sp4) 115.3 ml      SI(MOD-sp2) 26.4 ml/m2      SI(MOD-sp4) 46.2 ml/m2      EF(MOD-sp2) 52.4 %      EF(MOD-sp4) 67.8 %      MV E max juan 123.0 cm/sec      MV A max juan 64.7 cm/sec      MV dec time 0.18 msec      MV E/A 1.90     LA ESV Index (BP) 26.1 ml/m2      Med Peak E' Juan 10.8 cm/sec      Lat Peak E' Juan 13.2 cm/sec      Avg E/e' ratio 10.25     RVIDd 3.0 cm      TAPSE (>1.6) 3.9 cm      LA dimension (2D)  3.8 cm      MV dec slope 684.0 cm/sec2      Ao root diam 3.6 cm     Narrative:      •  Left ventricular systolic function is normal. Calculated left   ventricular EF = 61.5%  •  Left ventricular diastolic function was normal.      ECG 12 Lead ED Triage Standing Order; Acute Stroke (Onset <12 hrs) [[id number]] Collected: 05/21/23 0708     Updated: 05/21/23 1631     QT  Interval 422 ms     Narrative:      HEART RATE= 57  bpm  RR Interval= 1060  ms  MA Interval= 202  ms  P Horizontal Axis= 12  deg  P Front Axis= 41  deg  QRSD Interval= 90  ms  QT Interval= 422  ms  QRS Axis= 45  deg  T Wave Axis= 101  deg  - ABNORMAL ECG -  Sinus rhythm  Borderline prolonged MA interval  Nonspecific T abnormalities, lateral leads  No previous ECG available for comparison  Electronically Signed By: Blaise Beard ([facility]) 21-May-2023 16:31:37  Date and Time of Study: 2023-05-21 07:08:51    ECG 12 Lead Stroke Evaluation [[id number]] Collected: 05/22/23 0033     Updated: 05/22/23 0601     QT Interval 410 ms     Narrative:      HEART RATE= 77  bpm  RR Interval= 784  ms  MA Interval= 193  ms  P Horizontal Axis= 20  deg  P Front Axis= 68  deg  QRSD Interval= 91  ms  QT Interval= 410  ms  QRS Axis= 56  deg  T Wave Axis= 139  deg  - ABNORMAL ECG -  Incomplete analysis due to missing data in precordial lead(s)  Sinus rhythm  Consider anterior infarct  Nonspecific T abnormalities, lateral leads  Electronically Signed By:   Date and Time of Study: 2023-05-22 00:33:44          Orders (active)      Start     Ordered    05/22/23 2100  atorvastatin (LIPITOR) tablet 80 mg  Nightly         05/22/23 0841    05/22/23 1217  hydrALAZINE (APRESOLINE) injection 10 mg  Every 6 Hours PRN         05/22/23 1218    05/22/23 1200  Neuro Checks  Every 4 Hours      Comments: On arrival and handoff, increase frequency as clinically indicated or for thrombolytic administration, otherwise Q2 hours.  Documentation of arrival assessment and any neuro change required.    05/22/23 0841    05/22/23 0930  LORazepam (ATIVAN) tablet 0.5 mg  3 Times Daily         05/22/23 0843    05/22/23 0930  amLODIPine (NORVASC) tablet 10 mg  Daily         05/22/23 0843    05/22/23 0930  cholecalciferol (VITAMIN D3) tablet 1,000 Units  Daily         05/22/23 0843    05/22/23 0930  cetirizine (zyrTEC) tablet 10 mg  Daily         05/22/23 0843    05/22/23 0930   pantoprazole (PROTONIX) EC tablet 40 mg  Every Early Morning         05/22/23 0843    05/22/23 0930  clopidogrel (PLAVIX) tablet 75 mg  Daily         05/22/23 0843    05/22/23 0930  losartan (COZAAR) tablet 100 mg  Every 24 Hours Scheduled        See Hyperspace for full Linked Orders Report.    05/22/23 0844    05/22/23 0930  hydroCHLOROthiazide (HYDRODIURIL) tablet 25 mg  Every 24 Hours Scheduled        See Hyperspace for full Linked Orders Report.    05/22/23 0844    05/22/23 0757  Diet: Cardiac Diets; Healthy Heart (2-3 Na+); Texture: Regular Texture (IDDSI 7); Fluid Consistency: Thin (IDDSI 0)  Diet Effective Now         05/22/23 0756    05/21/23 1422  NIPPV (CPAP or BIPAP)  Until Discontinued         05/21/23 1422    05/21/23 1200  Intake and Output  Every 4 Hours       05/21/23 1045    05/21/23 1100  sodium chloride 0.9 % flush 10 mL  Every 12 Hours Scheduled         05/21/23 1045    05/21/23 1100  aspirin chewable tablet 81 mg  Daily        See Hyperspace for full Linked Orders Report.    05/21/23 1045    05/21/23 1100  aspirin suppository 300 mg  Daily        See Hyperspace for full Linked Orders Report.    05/21/23 1045    05/21/23 1100  Enoxaparin Sodium (LOVENOX) syringe 40 mg  Daily         05/21/23 1050    05/21/23 1046  Continuous Cardiac Monitoring  Continuous        Comments: Follow Standing Orders As Outlined in Process Instructions (Open Order Report to View Full Instructions)    05/21/23 1045    05/21/23 1046  Inpatient Case Management  Consult  Once        Provider:  (Not yet assigned)    05/21/23 1045    05/21/23 1046  Inpatient Neurology Consult Stroke  Once        Specialty:  Neurology  Provider:  (Not yet assigned)    05/21/23 1045    05/21/23 1046  Insert Peripheral IV  Once         05/21/23 1045    05/21/23 1046  Maintain Sequential Compression Device  Continuous         05/21/23 1045    05/21/23 1045  nitroglycerin (NITROSTAT) SL tablet 0.4 mg  Every 5 Minutes PRN          05/21/23 1045    05/21/23 1045  sodium chloride 0.9 % flush 10 mL  As Needed         05/21/23 1045    05/21/23 1045  sodium chloride 0.9 % infusion 40 mL  As Needed         05/21/23 1045    05/21/23 1045  acetaminophen (TYLENOL) tablet 650 mg  Every 4 Hours PRN        See Hyperspace for full Linked Orders Report.    05/21/23 1045    05/21/23 1045  acetaminophen (TYLENOL) suppository 650 mg  Every 4 Hours PRN        See Hyperspace for full Linked Orders Report.    05/21/23 1045    05/21/23 1004  Code Status and Medical Interventions:  Continuous         05/21/23 1007    05/21/23 0938  Inpatient Hospitalist Consult  Once        Specialty:  Hospitalist  Provider:  (Not yet assigned)    05/21/23 0937    05/21/23 0632  Insert Large Bore Peripheral IV - Right AC Preferred  Once         05/21/23 0632    05/21/23 0631  sodium chloride 0.9 % flush 10 mL  As Needed         05/21/23 0632    Unscheduled  Oxygen Therapy- Nasal Cannula; Titrate 1-6 LPM Per SpO2; 90 - 95%  Continuous PRN       05/21/23 0632    Unscheduled  Order CT Head Without Contrast for Neurological Decline  As Needed       05/21/23 1045

## 2023-05-22 NOTE — THERAPY EVALUATION
"Acute Care - Speech Language Pathology   Swallow Initial Evaluation  [facility]     Patient Name: Hardy Rendon  : 1975  MRN: [id number]  Today's Date: 2023               Admit Date: 2023    Visit Dx:     ICD-10-CM ICD-9-CM   1. Left-sided weakness  R53.1 728.87   2. Dysphagia, unspecified type  R13.10 787.20     Patient Active Problem List   Diagnosis   • Left-sided weakness     Past Medical History:   Diagnosis Date   • Anxiety    • Hypertension    • Intermittent explosive disorder    • Sleep apnea      Past Surgical History:   Procedure Laterality Date   • ESOPHAGEAL DILATATION         Inpatient Speech Pathology Dysphagia Evaluation        PAIN SCALE: None indicated    PRECAUTIONS/CONTRAINDICATIONS: Standard    SUSPECTED ABUSE/NEGLECT/EXPLOITATION: None identified    SOCIAL/PSYCHOLOGICAL NEEDS/BARRIERS: None identified    PAST SOCIAL HISTORY: 48-year-old male, reports working on the new [employer].    PRIOR FUNCTION: Patient reports history of Bell's palsy diagnosed approximately \"4 years ago\".  Reportedly eating a regular diet at home without any significant difficulty.    PATIENT GOALS/EXPECTATIONS: To begin eating and drinking    HISTORY: Patient is a 48-year-old male admitted to [facility] on 2023 secondary to left-sided weakness.  Patient states onset of weakness, falling in the shower.  He reportedly drove himself to the emergency room.  MRI did not reveal any acute abnormalities.  Patient reports diagnosis of Bell's palsy approximately \"4 years ago\".  He states intermittent \"flareups\" of his left facial weakness/droop.  He reports that he was eating a regular diet at home without any difficulty.  He is currently n.p.o. due to failing nursing dysphagia screening.    CURRENT DIET LEVEL: N.p.o.    OBJECTIVE:    TEST ADMINISTERED: Clinical dysphagia evaluation    COGNITION/SAFETY AWARENESS: Appears appropriate for current environment have not thoroughly " evaluated    BEHAVIORAL OBSERVATIONS: Alert and cooperative, sitting upright on edge of bed    ORAL MOTOR EXAM: Slight left-sided facial asymmetry preserved.  Labial retraction left 3+/5.  Labial retraction right 5/5.  Lingual protrusion is at midline without deviation.  Palatal elevation symmetrical.    VOICE QUALITY: Clear    REFLEX EXAM: Adequate    POSTURE: Sitting fully upright on edge of bed    FEEDING/SWALLOWING FUNCTION: Assessed with thin liquids, nectar thick liquids, purée solids, regular solids    CLINICAL OBSERVATIONS: Nectar thick and thin liquids administered by spoon, cup and straw.  Swallows were completed within a timely manner.  Vocal quality clear and laryngeal sounds clear with cervical auscultation.  Purée solids by spoon with swallows completed.  Regular solids with patient primarily chewing on the right.  Patient biting his inner left cheek x1.  Some left-sided oral residue however patient able to independently perform a lingual sweep to clear.  Laryngeal elevation was noted to palpation.  Patient did not demonstrate any overt signs or symptoms of aspiration at bedside however cannot rule out silent aspiration.    DYSPHAGIA CRITERIA: N/A    FUNCTIONAL ASSESSMENT INSTRUMENT: Patient currently scored a level 7 of 7 on Functional Communication Measures for swallowing indicating a 0% limitation in function.    ASSESSMENT/ PLAN OF CARE:  At bedside, patient exhibits swallow function adequate for tolerance of regular solids and thin liquids.  Patient is performing compensatory strategies at an independent level.  At this time, patient does not require any further skilled speech pathology services.  If patient does demonstrate any decline in status, please reconsult speech pathology.    PROBLEMS:  1.  na   LTG 1: na   STG 1a: na     RECOMMENDATIONS:   1.   DIET: Regular solids, thin liquids    2.  POSITION: Fully upright for all p.o. intake, 30 minutes following        Pt/responsible party agrees  with plan of care and has been informed of all alternatives, risks and benefits.    SLP Recommendation and Plan                          Anticipated Discharge Disposition (SLP): home with assist (05/22/23 0754)                                                            EDUCATION  The patient has been educated in the following areas:   Dysphagia (Swallowing Impairment).              Time Calculation:    Time Calculation- SLP     Row Name 05/22/23 0754             Time Calculation- SLP    SLP Start Time 0630  -SN      SLP Stop Time 0730  -SN      SLP Time Calculation (min) 60 min  -SN      SLP Received On 05/22/23  -SN         Untimed Charges    84684-PG Eval Oral Pharyng Swallow Minutes 60  -SN         Total Minutes    Untimed Charges Total Minutes 60  -SN       Total Minutes 60  -SN            User Key  (r) = Recorded By, (t) = Taken By, (c) = Cosigned By    Initials Name Provider Type    Arianna Quintanilla MS-CCC/SLP, ELAINE Speech and Language Pathologist                Therapy Charges for Today     Code Description Service Date Service Provider Modifiers Qty    [id number] HC ST EVAL ORAL PHARYNG SWALLOW 4 5/22/2023 Arianna Watson MS-CCC/SLP, CNT GN 1               DEVAN Alex/ELAINE ERICKSON  5/22/2023

## 2023-05-22 NOTE — PROGRESS NOTES
[facility]   Hospitalist Progress Note  Date: 2023  Patient Name: Hardy Rendon  : 1975  MRN: [id number]  Date of admission: 2023      Subjective   Subjective     Chief Complaint: Loss of consciousness and left-sided numbness.      Summary:   Hardy Rendon is a 48 y.o. male with history of obstructive sleep apnea on CPAP at home, hypertension, intermittent explosive disorder, anxiety who had a fall in his shower this morning.  Patient says he vaguely remembers the fall.  During fall he hit the back side of his head.  Did not have a bruise and the area does not hurt.  Says he may have lost consciousness for few minutes.  He is not sure if he was sleepwalking when he fell.  Upon waking up he was feeling well and called his wife who suggested he goes to the ER.  Patient drove himself to the ER.  On his way he felt numbness in his left side of the face and arm.  He felt left lower extremity weakness as well.  Upon presentation to the ER, there was possible left-sided facial droop with left extremity weakness.  Stat neurology consult was placed.  Per neurology note not a candidate for TNK .  CTA head and neck was negative for acute pathology.  During my encounter with the patient, patient did not have left-sided facial droop.  He had some left hand weakness and tingling in his hand.  He was able to lift his left lower extremity without any issues.  Patient MRI did not show any acute finding.  2D echo with EF of 67%.  Transaminitis improved.  Urine drug screen is negative.    Interval Followup:   Blood pressure up.  Sinus bradycardia.  Telemetry without alarms  Patient denies any loss of consciousness since in the hospital.  Patient does have a history of sleepwalking.  Patient wants to know why he has been passing out frequently in the past 6 to 8 weeks.  He has discussed this with his PCP with no intervention.  Mostly happens at home with sleepwalking when he uses commode.  He did happen one time at  work where he passed out for a short time without any injury.  No seizure activity noted with this.  There is no palpitations or other warning symptoms before passing out.  No weakness in arms or legs.  No more numbness.    Review of Systems   All systems were reviewed and negative except for: Summary and interval follow-up    Objective   Objective     Vitals:   Temp:  [98 °F (36.7 °C)-98.2 °F (36.8 °C)] 98.2 °F (36.8 °C)  Heart Rate:  [64-81] 81  Resp:  [12-20] 20  BP: (154-210)/() 172/90  Flow (L/min):  [3] 3  Physical Exam    Constitutional: Awake, alert, no acute distress   Eyes: Pupils equal, sclerae anicteric, no conjunctival injection   HENT: NCAT, mucous membranes moist   Neck: Supple, no thyromegaly, no lymphadenopathy, trachea midline   Respiratory: Clear to auscultation bilaterally, nonlabored respirations    Cardiovascular: RRR, no murmurs, rubs, or gallops, palpable pedal pulses bilaterally   Gastrointestinal: Positive bowel sounds, soft, nontender, nondistended   Musculoskeletal: No bilateral ankle edema, no clubbing or cyanosis to extremities   Psychiatric: Appropriate affect, cooperative   Neurologic: Oriented x 3, strength symmetric in all extremities, Cranial Nerves grossly intact to confrontation, speech clear   Skin: No rashes     Result Review    Result Review:  I have personally reviewed the results for the past 24 hours and agree with these findings:  [x]  Laboratory  []  Microbiology  [x]  Radiology  [x]  EKG/Telemetry normal sinus rhythm rate of 77  []  Cardiology/Vascular   []  Pathology  [x]  Old records  [x]  Other: Medications    Assessment & Plan   Assessment / Plan     Assessment:  Left-sided weakness and numbness.  Resolved.  ?  TIA causing above.  Recurrent syncopal episodes with fall in shower prior to admission.  Mild head contusion due to above.  Obstructive sleep apnea on home CPAP.  Hypertension uncontrolled.  Transaminitis improving.  Anxiety disorder.  Intermittent  explosive disorder.  Obesity BMI 48.6  Hyperlipidemia with LDL of 81.       Plan:  Continue neurochecks.  Continue telemetry.  Appreciate telemetry neuro input.  Work-up has been complete.  Discussed with cardiology due to patient's concern about recurrent loss of consciousness.  May need tilt table test.  Continue aspirin.  Plavix added for 3 weeks.  Lipitor.  Continue home Norvasc, losartan and HCTZ.  As needed IV hydralazine.  2D echo noted.  Urine drug screen negative.  Appreciate PT OT input ; recommend outpatient physical therapy for mild left lower extremity weakness.  Appreciate speech therapy input    Discussed plan with RN and .  Discharge home in a.m. if cleared by telemetry neuro and cardiology.    DVT prophylaxis:  Medical and mechanical DVT prophylaxis orders are present.    CODE STATUS:   Code Status (Patient has no pulse and is not breathing): CPR (Attempt to Resuscitate)  Medical Interventions (Patient has pulse or is breathing): Full Support  Release to patient: Routine Release      Part of this note may be an electronic transcription/translation of spoken language to printed text using the Dragon Dictation System.     Electronically signed by Dedrick Valentin MD, 05/22/23, 4:24 PM EDT.

## 2023-05-22 NOTE — PLAN OF CARE
Goal Outcome Evaluation:  Plan of Care Reviewed With: patient           Outcome Evaluation: Patient did not demonstrate any safety or mobility deficits that would prevent him from returning home today.  He is safe to continue ambulating within his room independently until his discharge from the hospital.  Recommend follow-up with outpatient physical therapy services to address his mild left lower extremity weakness.  He will be discharged from inpatient physical therapy services at this time

## 2023-05-22 NOTE — CONSULTS
Consult received per Stroke Protocol. Patient without a noted DM diagnosis, with a current HbA1c of 5.9%, and an estimated average glucose of 123 mg/dL. Blood glucose values have remained WDL, with a low of 83 mg/dL, and a high of 113 mg/dL.

## 2023-05-22 NOTE — PLAN OF CARE
Goal Outcome Evaluation:  Plan of Care Reviewed With: patient        Progress: no change  Outcome Evaluation: Pt did wear bipap for a few hours last night. V60 is now on stand-by at bedside. Pt is currently on a 3L NC resting comfortably.

## 2023-05-22 NOTE — PLAN OF CARE
Goal Outcome Evaluation:   Pt. Is alert and oriented. Pt rested well with no complaints of pain. Pts. Assessment remained the same from beginning of shift.  Pt tolerated CPAP well. NICOALS PABON RN

## 2023-05-22 NOTE — PLAN OF CARE
Goal Outcome Evaluation:  Plan of Care Reviewed With: patient        Progress: no change  Outcome Evaluation: Patient does not present with any significant decline in function and does not require inpatient occupational therapy, therefore they will be discharged from services at this time. He is independent with all BADLs and functional transfers without a device. It is recommended he return home when medically able to do so, patient is aware and agreeable with treatment plan at this time.

## 2023-05-22 NOTE — THERAPY EVALUATION
Patient Name: Hardy Rendon  : 1975    MRN: [id number]                              Today's Date: 2023       Admit Date: 2023    Visit Dx:     ICD-10-CM ICD-9-CM   1. Left-sided weakness  R53.1 728.87   2. Dysphagia, unspecified type  R13.10 787.20   3. Difficulty in walking  R26.2 719.7   4. Decreased activities of daily living (ADL)  Z78.9 V49.89     Patient Active Problem List   Diagnosis   • Left-sided weakness     Past Medical History:   Diagnosis Date   • Anxiety    • Hypertension    • Intermittent explosive disorder    • Sleep apnea      Past Surgical History:   Procedure Laterality Date   • ESOPHAGEAL DILATATION        General Information     Row Name 23 1354          OT Time and Intention    Document Type evaluation  -     Mode of Treatment individual therapy;occupational therapy  -     Row Name 23 1357          General Information    Patient Profile Reviewed yes  -     Prior Level of Function --  (I) with ADLs, ambulated w/o a device, has a garden tub and shower stall where he stands to shower, standard commode, stands to groom, drives, has a CPAP, and 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  -     Existing Precautions/Restrictions fall  -     Barriers to Rehab none identified  -     Row Name 23 1355          Occupational Profile    Reason for Services/Referral (Occupational Profile) Patient is a 48 year old male admitted to [facility] for left sided numbness on May 21st, 2023. Occupational therapy consulted due to recent decline in ADLs/functional transfers. No previous occupational therapy services for current condition.  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  -     Row Name 05/22/23 [facility]9          Transfers    Transfers sit-stand transfer;stand-sit transfer  -     Row Name 05/22/23 [facility]          Sit-Stand Transfer    Sit-Stand [place] (Transfers) independent  -     Row Name 05/22/23 [facility]          Stand-Sit Transfer    Stand-Sit [place] (Transfers) independent  -     Row Name 05/22/23 [facility]          Functional Mobility    Functional Mobility- Ind. Level independent  -[facility] Name 05/22/23 [facility]          Activities of Daily Living    BADL Assessment/Intervention bathing;upper body dressing;lower body dressing;grooming;feeding;toileting  -     Row Name 05/22/23 [facility]          Bathing Assessment/Intervention    [place] Level (Bathing) bathing skills;upper body;independent;lower body;standby assist  -     Row Name 05/22/23 [facility]          Upper Body Dressing Assessment/Training    [place] Level (Upper Body Dressing) upper body dressing skills;independent  -[facility] Name 05/22/23 [facility]          Lower Body Dressing Assessment/Training    [place] Level (Lower Body Dressing) lower body dressing skills;standby assist  -     Comment, (Lower Body Dressing) Uses a dressing stick at baseline for donning boots. Educated patient on use of long-handled shoe horn, sock-aid, and reacher to further increase independence with LB dressing tasks at d/c.  -     Row Name 05/22/23 [facility]          Grooming Assessment/Training    [place] Level (Grooming) grooming skills;independent  -[facility] Name 05/22/23 [facility]          Self-Feeding  Assessment/Training    [place] Level (Feeding) feeding skills;independent  -     Row Name 05/22/23 1359          Toileting Assessment/Training    [place] Level (Toileting) toileting skills;independent  -           User Key  (r) = Recorded By, (t) = Taken By, (c) = Cosigned By    Initials Name Provider Type     Alisha Daley OT Occupational Therapist               Obj/Interventions     Row Name 05/22/23 1401          Sensory Assessment (Somatosensory)    Sensory Assessment (Somatosensory) UE sensation intact  BUEs intact to light touch discrimination throughout. Patient reports numbness/tingling in left hand at baseline, stating that it is likely due to his hx of a pinched nerve. He reports often dropping objects prior to admission.  -     Row Name 05/22/23 1401          Vision Assessment/Intervention    Visual Impairment/Limitations WFL  Left ptosis noted with no reports of vision loss per pt. Full tracking, visual fields intact in all four quadrants, and peripheral vision intact bilaterally. Appears WFL for BADLs.  -     Row Name 05/22/23 1401          Range of Motion Comprehensive    General Range of Motion bilateral upper extremity ROM WFL  -[facility] Name 05/22/23 1401          Strength Comprehensive (MMT)    Comment, General Manual Muscle Testing (MMT) Assessment 5/5 RUE throughout and proximal LUE, left  4/5.  -     Row Name 05/22/23 1401          Motor Skills    Motor Skills coordination;functional endurance  -LF     Coordination right;upper extremity;WFL;left;minimal impairment  Patient reports often dropping objects using left due to numbness/tingling secondary to pinched nerve at basesline.  -LF     Functional Endurance Fair+  -     Row Name 05/22/23 1401          Balance    Balance Assessment sitting dynamic balance;standing dynamic balance  -LF     Dynamic Sitting Balance independent  -LF     Position, Sitting Balance unsupported;sitting edge of bed  -LF     Dynamic  Standing Balance independent  -LF     Position/Device Used, Standing Balance unsupported  -           User Key  (r) = Recorded By, (t) = Taken By, (c) = Cosigned By    Initials Name Provider Type     Alisha Daley OT Occupational Therapist               Goals/Plan    No documentation.                Clinical Impression     [facility] Name 05/22/23 1407          Pain Assessment    Additional Documentation Pain Scale: FACES Pre/Post-Treatment (Group)  -LF     Row Name 05/22/23 1407          Pain Scale: FACES Pre/Post-Treatment    Pain: FACES Scale, Pretreatment 0-->no hurt  -LF     Posttreatment Pain Rating 0-->no hurt  -LF     Row Name 05/22/23 1407          Plan of Care Review    Plan of Care Reviewed With patient  -     Progress no change  -     Outcome Evaluation Patient does not present with any significant decline in function and does not require inpatient occupational therapy, therefore they will be discharged from services at this time. He is independent with all BADLs and functional transfers without a device. It is recommended he return home when medically able to do so, patient is aware and agreeable with treatment plan at this time.  -LF     Row Name 05/22/23 1407          Therapy Assessment/Plan (OT)    Patient/Family Therapy Goal Statement (OT) None reported  -     Rehab Potential (OT) other (see comments)  N/A  -     Criteria for Skilled Therapeutic Interventions Met (OT) no problems identified which require skilled intervention  -     Therapy Frequency (OT) evaluation only  -     Row Name 05/22/23 1407          Therapy Plan Review/Discharge Plan (OT)    Anticipated Discharge Disposition (OT) home  -LF     Row Name 05/22/23 1407          Vital Signs    O2 Delivery Pre Treatment room air  -LF     O2 Delivery Intra Treatment room air  -LF     O2 Delivery Post Treatment room air  -LF           User Key  (r) = Recorded By, (t) = Taken By, (c) = Cosigned By    Initials Name Provider Type      Alisha Daley, OT Occupational Therapist               Outcome Measures     Row Name 05/22/23 1408          How much help from another is currently needed...    Putting on and taking off regular lower body clothing? 3  -LF     Bathing (including washing, rinsing, and drying) 3  -LF     Toileting (which includes using toilet bed pan or urinal) 4  -LF     Putting on and taking off regular upper body clothing 4  -LF     Taking care of personal grooming (such as brushing teeth) 4  -LF     Eating meals 4  -LF     AM-PAC 6 Clicks Score (OT) 22  -LF     Row Name 05/22/23 1000 05/22/23 0736       How much help from another person do you currently need...    Turning from your back to your side while in flat bed without using bedrails? 4  -YOVANI 4  -BG    Moving from lying on back to sitting on the side of a flat bed without bedrails? 4  -YOVANI 4  -BG    Moving to and from a bed to a chair (including a wheelchair)? 4  -YOVANI 4  -BG    Standing up from a chair using your arms (e.g., wheelchair, bedside chair)? 4  -YOVANI 4  -BG    Climbing 3-5 steps with a railing? 4  -YOVANI 3  -BG    To walk in hospital room? 4  -YOVANI 3  -BG    AM-PAC 6 Clicks Score (PT) 24  -YOVANI 22  -BG    Highest level of mobility 8 --> Walked 250 feet or more  -YOVANI 7 --> Walked 25 feet or more  -BG    Row Name 05/22/23 1408 05/22/23 1000       Functional Assessment    Outcome Measure Options AM-PAC 6 Clicks Daily Activity (OT);Optimal Instrument  -LF AM-PAC 6 Clicks Basic Mobility (PT)  -YOVANI    Row Name 05/22/23 1408          Optimal Instrument    Optimal Instrument Optimal - 3  -LF     Bending/Stooping 2  -LF     Standing 1  -LF     Reaching 1  -LF     From the list, choose the 3 activities you would most like to be able to do without any difficulty Bending/stooping;Standing;Reaching  -LF     Total Score Optimal - 3 4  -LF           User Key  (r) = Recorded By, (t) = Taken By, (c) = Cosigned By    Initials Name Provider Type    Matilde Tiwari RN Registered Nurse      Alisha Daley OT Occupational Therapist    Que Sandhu, PT Physical Therapist                Occupational Therapy Education     Title: PT OT SLP Therapies (Done)     Topic: Occupational Therapy (Done)     Point: ADL training (Done)     Description:   Instruct learner(s) on proper safety adaptation and remediation techniques during self care or transfers.   Instruct in proper use of assistive devices.              Learning Progress Summary           Patient Acceptance, E,TB, VU by  at 5/22/2023 1408                   Point: Precautions (Done)     Description:   Instruct learner(s) on prescribed precautions during self-care and functional transfers.              Learning Progress Summary           Patient Acceptance, E,TB, VU by  at 5/22/2023 1408                   Point: Body mechanics (Done)     Description:   Instruct learner(s) on proper positioning and spine alignment during self-care, functional mobility activities and/or exercises.              Learning Progress Summary           Patient Acceptance, E,TB, VU by  at 5/22/2023 1408                               User Key     Initials Effective Dates Name Provider Type Discipline     06/16/21 -  Alisha Daley OT Occupational Therapist OT              OT Recommendation and Plan  Therapy Frequency (OT): evaluation only  Plan of Care Review  Plan of Care Reviewed With: patient  Progress: no change  Outcome Evaluation: Patient does not present with any significant decline in function and does not require inpatient occupational therapy, therefore they will be discharged from services at this time. He is independent with all BADLs and functional transfers without a device. It is recommended he return home when medically able to do so, patient is aware and agreeable with treatment plan at this time.     Time Calculation:    Time Calculation- OT     Row Name 05/22/23 1409             Time Calculation- OT    OT Received On 05/22/23  -          Untimed Charges    OT Eval/Re-eval Minutes 34  -LF         Total Minutes    Untimed Charges Total Minutes 34  -LF       Total Minutes 34  -LF            User Key  (r) = Recorded By, (t) = Taken By, (c) = Cosigned By    Initials Name Provider Type    Alisha Ross OT Occupational Therapist              Therapy Charges for Today     Code Description Service Date Service Provider Modifiers Qty    74057015916 HC OT EVAL LOW COMPLEXITY 3 5/22/2023 Alisha Daley OT GO 1               Alisha Daley OT  5/22/2023

## 2023-05-22 NOTE — PLAN OF CARE
Goal Outcome Evaluation:  Plan of Care Reviewed With: patient        Progress: no change  Outcome Evaluation: Patient has our V60 CPAP 8cmH20 set up in their room for PRN/nightly use. Patient did use last night.

## 2023-05-22 NOTE — PLAN OF CARE
Goal Outcome Evaluation:               Ct and mri came back negative for stroke, NIH has been discontinued, pt to possibly discharge tomorrow pending cardiology.

## 2023-05-22 NOTE — PLAN OF CARE
Goal Outcome Evaluation:   Patient has worn Cpap all night till now and is doing well off the unit

## 2023-05-22 NOTE — THERAPY EVALUATION
Acute Care - Physical Therapy Initial Evaluation  SUZI Maciel     Patient Name: Hadry Rendon  : 1975  MRN: [id number]  Today's Date: 2023     Admit date: 2023     Referring Physician: Dedrick Valentin MD     Surgery Date:* No surgery found *               Visit Dx:     ICD-10-CM ICD-9-CM   1. Left-sided weakness  R53.1 728.87   2. Dysphagia, unspecified type  R13.10 787.20   3. Difficulty in walking  R26.2 719.7     Patient Active Problem List   Diagnosis   • Left-sided weakness     Past Medical History:   Diagnosis Date   • Anxiety    • Hypertension    • Intermittent explosive disorder    • Sleep apnea      Past Surgical History:   Procedure Laterality Date   • ESOPHAGEAL DILATATION       PT Assessment (last 12 hours)     PT Evaluation and Treatment     Row Name 23 1013          Physical Therapy Time and Intention    Subjective Information no complaints  -YOVANI     Document Type evaluation  -YOVANI     Mode of Treatment individual therapy;physical therapy  -YOVANI     Patient Effort good  -YOVANI     Row Name 23 1013          General Information    Patient Observations alert;cooperative;agree to therapy  -YOVANI     Prior Level of Function independent:;all household mobility;community mobility  -YOVANI     Equipment Currently Used at Home none  -YOVANI     Barriers to Rehab none identified  -YOVANI     Row Name 23 1013          Living Environment    Current Living Arrangements home  -YOVANI     People in Home spouse  -YOVANI     Row Name 23 1013          Range of Motion (ROM)    Range of Motion ROM is WFL  -YOVANI     Row Name 23 1013          Strength (Manual Muscle Testing)    Strength (Manual Muscle Testing) bilateral lower extremities  RLE 5/5; LLE 4+/5  -YOVANI     Row Name 23 1013          Transfers    Transfers sit-stand transfer  -YOVANI     Row Name 23 1013          Sit-Stand Transfer    Sit-Stand [place] (Transfers) independent  -YOVANI     Row Name 23 1013          Gait/Stairs (Locomotion)     Gait/Stairs Locomotion gait/ambulation independence  -YOVANI     [place] Level (Gait) independent  -YOVANI     Distance in Feet (Gait) 200  -YOVANI     Row Name 05/22/23 1013          Balance    Balance Assessment standing dynamic balance  -YOVANI     Dynamic Standing Balance independent  -YOVANI     Row Name 05/22/23 1022          Plan of Care Review    Plan of Care Reviewed With patient  -YOVANI     Outcome Evaluation Patient did not demonstrate any safety or mobility deficits that would prevent him from returning home today.  He is safe to continue ambulating within his room independently until his discharge from the hospital.  Recommend follow-up with outpatient physical therapy services to address his mild left lower extremity weakness.  He will be discharged from inpatient physical therapy services at this time  -YOVANI     Row Name 05/22/23 1022          Therapy Assessment/Plan (PT)    Criteria for Skilled Interventions Met (PT) no problems identified which require skilled intervention  -YOVANI     Therapy Frequency (PT) evaluation only  -YOVANI     Row Name 05/22/23 1022          PT Evaluation Complexity    History, PT Evaluation Complexity no personal factors and/or comorbidities  -YOVANI     Examination of Body Systems (PT Eval Complexity) total of 4 or more elements  -YOVANI     Clinical Presentation (PT Evaluation Complexity) stable  -YOVANI     Clinical Decision Making (PT Evaluation Complexity) low complexity  -YOVANI     Overall Complexity (PT Evaluation Complexity) low complexity  -YOVANI           User Key  (r) = Recorded By, (t) = Taken By, (c) = Cosigned By    Initials Name Provider Type    Que Sandhu, PT Physical Therapist                  PT Recommendation and Plan  Anticipated Discharge Disposition (PT): home with outpatient therapy services  Therapy Frequency (PT): evaluation only  Plan of Care Reviewed With: patient  Outcome Evaluation: Patient did not demonstrate any safety or mobility deficits that would prevent him from returning  home today.  He is safe to continue ambulating within his room independently until his discharge from the hospital.  Recommend follow-up with outpatient physical therapy services to address his mild left lower extremity weakness.  He will be discharged from inpatient physical therapy services at this time   Outcome Measures     Row Name 05/22/23 1000             How much help from another person do you currently need...    Turning from your back to your side while in flat bed without using bedrails? 4  -YOVANI      Moving from lying on back to sitting on the side of a flat bed without bedrails? 4  -YOVANI      Moving to and from a bed to a chair (including a wheelchair)? 4  -YOVANI      Standing up from a chair using your arms (e.g., wheelchair, bedside chair)? 4  -YOVANI      Climbing 3-5 steps with a railing? 4  -YOVANI      To walk in hospital room? 4  -YOVANI      AM-PAC 6 Clicks Score (PT) 24  -YOVANI         Functional Assessment    Outcome Measure Options AM-PAC 6 Clicks Basic Mobility (PT)  -YOVANI            User Key  (r) = Recorded By, (t) = Taken By, (c) = Cosigned By    Initials Name Provider Type    Que Sandhu PT Physical Therapist                 Time Calculation:    PT Charges     Row Name 05/22/23 1021             Time Calculation    PT Received On 05/22/23  -YOVANI         Untimed Charges    PT Eval/Re-eval Minutes 20  -YOVANI         Total Minutes    Untimed Charges Total Minutes 20  -YOVANI       Total Minutes 20  -YOVANI            User Key  (r) = Recorded By, (t) = Taken By, (c) = Cosigned By    Initials Name Provider Type    Que Sandhu PT Physical Therapist              Therapy Charges for Today     Code Description Service Date Service Provider Modifiers Qty    10697914982  PT EVAL LOW COMPLEXITY 2 5/22/2023 Que Ram, PT GP 1          PT G-Codes  Outcome Measure Options: AM-PAC 6 Clicks Basic Mobility (PT)  AM-PAC 6 Clicks Score (PT): 24    Que Ram PT  5/22/2023

## 2023-05-22 NOTE — CASE MANAGEMENT/SOCIAL WORK
Discharge Planning Assessment   Janell     Patient Name: Hardy Rendon  MRN: [id number]  Today's Date: 5/22/2023    Admit Date: 5/21/2023    Plan: patient is out of state at the moment 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 Patient is from [place], lives at home with his spouse and denies additional needs at this time other than figuring out wqhy he passed out of in the shower. [place] through [facility],. Patient plans to return home at discharge   Discharge Needs Assessment     Row Name 05/22/23 1515       Living Environment    People in Home spouse    Current Living Arrangements home    Potentially Unsafe Housing Conditions none    Primary Care Provided by self    Family Caregiver if Needed spouse    Quality of Family Relationships helpful;involved;supportive    Able to Return to Prior Arrangements yes       Resource/Environmental Concerns    Resource/Environmental Concerns none    Transportation Concerns none       Transition Planning    Patient/Family Anticipates Transition to home with family    Patient/Family Anticipated Services at Transition none    Transportation Anticipated family or friend will provide       Discharge Needs Assessment    Readmission Within the Last 30 Days no previous admission in last 30 days    Equipment Currently Used at Home cpap;cane, straight    Concerns to be Addressed discharge planning    Anticipated Changes Related to Illness none    Equipment Needed After Discharge none               Discharge Plan     Row Name 05/22/23 1518       Plan    Plan patient is out of state at the moment 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 Patient is from [place], lives at home with his spouse and denies additional needs at this time other than figuring out wqhy he passed out of in the shower. [place] through [facility],. Patient plans to return home at discharge              Continued Care and Services -  Admitted Since 5/21/2023    Coordination has not been started for this encounter.          Demographic Summary     Row Name 05/22/23 1513       General Information    Admission Type inpatient    Arrived From home;emergency department    Referral Source admission list    Reason for Consult discharge planning    Preferred Language English       Contact Information    Permission Granted to Share Info With family/designee    Contact Information Obtained for                Functional Status     Row Name 05/22/23 1515       Functional Status    Usual Activity Tolerance good    Current Activity Tolerance good       Assessment of Health Literacy    How often do you have someone help you read hospital materials? Occasionally    How often do you have problems learning about your medical condition because of difficulty understanding written information? Never    How often do you have a problem understanding what is told to you about your medical condition? Never    How confident are you filling out medical forms by yourself? Quite a bit    Health Literacy Good       Functional Status, IADL    Medications independent    Meal Preparation independent    Housekeeping independent    Laundry independent    Shopping independent       Mental Status    General Appearance WDL WDL       Mental Status Summary    Recent Changes in Mental Status/Cognitive Functioning no changes               Psychosocial    No documentation.                Abuse/Neglect    No documentation.                Legal    No documentation.                Substance Abuse    No documentation.                Patient Forms    No documentation.                   Mary Carter RN

## 2023-05-23 ENCOUNTER — READMISSION MANAGEMENT (OUTPATIENT)
Dept: CALL CENTER | Facility: HOSPITAL | Age: 48
End: 2023-05-23
Payer: COMMERCIAL

## 2023-05-23 ENCOUNTER — APPOINTMENT (OUTPATIENT)
Dept: CARDIOLOGY | Facility: HOSPITAL | Age: 48
End: 2023-05-23
Payer: COMMERCIAL

## 2023-05-23 VITALS
HEART RATE: 77 BPM | HEIGHT: 68 IN | BODY MASS INDEX: 47.74 KG/M2 | OXYGEN SATURATION: 100 % | WEIGHT: 315 LBS | RESPIRATION RATE: 20 BRPM | DIASTOLIC BLOOD PRESSURE: 85 MMHG | TEMPERATURE: 97.9 F | SYSTOLIC BLOOD PRESSURE: 157 MMHG

## 2023-05-23 PROBLEM — R53.1 LEFT-SIDED WEAKNESS: Status: RESOLVED | Noted: 2023-05-21 | Resolved: 2023-05-23

## 2023-05-23 PROBLEM — G47.33 OBSTRUCTIVE SLEEP APNEA: Status: ACTIVE | Noted: 2023-05-23

## 2023-05-23 LAB
BASE EXCESS BLDA CALC-SCNC: 5 MMOL/L (ref -2–2)
BDY SITE: ABNORMAL
COHGB MFR BLD: 0.5 % (ref 0–1.5)
FHHB: 6.1 % (ref 0–5)
GAS FLOW AIRWAY: 2 LPM
HCO3 BLDA-SCNC: 30.8 MMOL/L (ref 22–26)
HGB BLDA-MCNC: 14.5 G/DL (ref 13.8–16.4)
MAXIMAL PREDICTED HEART RATE: 174 BPM
METHGB BLD QL: 0.2 % (ref 0–1.5)
MODALITY: ABNORMAL
OXYHGB MFR BLDV: 93.2 % (ref 94–99)
PCO2 BLDA: 49.6 MM HG (ref 35–45)
PH BLDA: 7.41 PH UNITS (ref 7.35–7.45)
PO2 BLDA: 71.6 MM HG (ref 80–100)
SAO2 % BLDCOA: 93.9 % (ref 95–99)
STRESS TARGET HR: 148 BPM

## 2023-05-23 PROCEDURE — 94799 UNLISTED PULMONARY SVC/PX: CPT

## 2023-05-23 PROCEDURE — 83050 HGB METHEMOGLOBIN QUAN: CPT | Performed by: INTERNAL MEDICINE

## 2023-05-23 PROCEDURE — 25010000002 ENOXAPARIN PER 10 MG: Performed by: INTERNAL MEDICINE

## 2023-05-23 PROCEDURE — 99239 HOSP IP/OBS DSCHRG MGMT >30: CPT | Performed by: INTERNAL MEDICINE

## 2023-05-23 PROCEDURE — 99221 1ST HOSP IP/OBS SF/LOW 40: CPT | Performed by: INTERNAL MEDICINE

## 2023-05-23 PROCEDURE — 82375 ASSAY CARBOXYHB QUANT: CPT | Performed by: INTERNAL MEDICINE

## 2023-05-23 PROCEDURE — 93660 TILT TABLE EVALUATION: CPT

## 2023-05-23 PROCEDURE — 93660 TILT TABLE EVALUATION: CPT | Performed by: INTERNAL MEDICINE

## 2023-05-23 PROCEDURE — 36600 WITHDRAWAL OF ARTERIAL BLOOD: CPT | Performed by: INTERNAL MEDICINE

## 2023-05-23 PROCEDURE — 82805 BLOOD GASES W/O2 SATURATION: CPT | Performed by: INTERNAL MEDICINE

## 2023-05-23 PROCEDURE — 94660 CPAP INITIATION&MGMT: CPT

## 2023-05-23 PROCEDURE — 25010000002 HYDRALAZINE PER 20 MG: Performed by: INTERNAL MEDICINE

## 2023-05-23 RX ORDER — CLOPIDOGREL BISULFATE 75 MG/1
75 TABLET ORAL DAILY
Qty: 30 TABLET | Refills: 0 | Status: SHIPPED | OUTPATIENT
Start: 2023-05-24 | End: 2023-06-23

## 2023-05-23 RX ORDER — HYDRALAZINE HYDROCHLORIDE 25 MG/1
25 TABLET, FILM COATED ORAL 3 TIMES DAILY
Qty: 90 TABLET | Refills: 0 | Status: SHIPPED | OUTPATIENT
Start: 2023-05-23 | End: 2023-06-22

## 2023-05-23 RX ORDER — METOPROLOL SUCCINATE 200 MG/1
100 TABLET, EXTENDED RELEASE ORAL DAILY
Qty: 15 TABLET | Refills: 0 | Status: SHIPPED | OUTPATIENT
Start: 2023-05-23 | End: 2023-06-22

## 2023-05-23 RX ORDER — ATORVASTATIN CALCIUM 80 MG/1
80 TABLET, FILM COATED ORAL NIGHTLY
Qty: 30 TABLET | Refills: 0 | Status: SHIPPED | OUTPATIENT
Start: 2023-05-23 | End: 2023-06-22

## 2023-05-23 RX ORDER — METOPROLOL SUCCINATE 50 MG/1
50 TABLET, EXTENDED RELEASE ORAL
Status: DISCONTINUED | OUTPATIENT
Start: 2023-05-23 | End: 2023-05-23 | Stop reason: HOSPADM

## 2023-05-23 RX ADMIN — PANTOPRAZOLE SODIUM 40 MG: 40 TABLET, DELAYED RELEASE ORAL at 06:08

## 2023-05-23 RX ADMIN — ENOXAPARIN SODIUM 40 MG: 100 INJECTION SUBCUTANEOUS at 11:02

## 2023-05-23 RX ADMIN — HYDRALAZINE HYDROCHLORIDE 10 MG: 20 INJECTION, SOLUTION INTRAMUSCULAR; INTRAVENOUS at 09:57

## 2023-05-23 RX ADMIN — AMLODIPINE BESYLATE 10 MG: 5 TABLET ORAL at 14:27

## 2023-05-23 RX ADMIN — HYDROCHLOROTHIAZIDE 25 MG: 25 TABLET ORAL at 14:29

## 2023-05-23 RX ADMIN — ASPIRIN 81 MG 81 MG: 81 TABLET ORAL at 14:28

## 2023-05-23 RX ADMIN — Medication 10 ML: at 09:57

## 2023-05-23 RX ADMIN — LOSARTAN POTASSIUM 100 MG: 50 TABLET, FILM COATED ORAL at 14:27

## 2023-05-23 RX ADMIN — METOPROLOL SUCCINATE 50 MG: 50 TABLET, EXTENDED RELEASE ORAL at 14:28

## 2023-05-23 RX ADMIN — CETIRIZINE HYDROCHLORIDE 10 MG: 10 TABLET, FILM COATED ORAL at 14:29

## 2023-05-23 RX ADMIN — CLOPIDOGREL BISULFATE 75 MG: 75 TABLET ORAL at 14:29

## 2023-05-23 RX ADMIN — Medication 1000 UNITS: at 14:28

## 2023-05-23 NOTE — DISCHARGE SUMMARY
[facility]        HOSPITALIST  DISCHARGE SUMMARY    Patient Name: Hardy Rendon  : 1975  MRN: [id number]    Date of Admission: 2023  Date of Discharge:  2023  Primary Care Physician: Provider, No Known    Consults     Date and Time Order Name Status Description    2023  4:23 PM Inpatient Cardiology Consult Completed     2023 10:45 AM Inpatient Neurology Consult Stroke      2023  9:37 AM Inpatient Hospitalist Consult            Active and Resolved Hospital Problems:  Active Hospital Problems    Diagnosis POA   • Obstructive sleep apnea [G47.33] Yes      Resolved Hospital Problems    Diagnosis POA   • **Left-sided weakness [R53.1] Yes   Left-sided weakness and numbness.  Resolved.  ?  TIA causing above.  Recurrent syncopal episodes with fall in shower prior to admission.  It is most likely due to excessive  sleepiness from his severe obstructive sleep apnea.  Mild head contusion due to above.  Obstructive sleep apnea on home CPAP with nocturnal hypoxemia.  Hypertension uncontrolled.  Transaminitis improving.  Anxiety disorder.  Intermittent explosive disorder.  Obesity BMI 48.6  Hyperlipidemia with LDL of 81.    Hospital Course     Hospital Course:  Hardy Rendon is a 48 y.o. male with history of obstructive sleep apnea on CPAP at home, hypertension, intermittent explosive disorder, anxiety who had a fall in his shower this morning.  Patient says he vaguely remembers the fall.  During fall he hit the back side of his head.  Did not have a bruise and the area does not hurt.  Says he may have lost consciousness for few minutes.  He is not sure if he was sleepwalking when he fell.  Upon waking up he was feeling well and called his wife who suggested he goes to the ER.  Patient drove himself to the ER.  On his way he felt numbness in his left side of the face and arm.  He felt left lower extremity weakness as well.  Upon presentation to the ER, there was possible left-sided  facial droop with left extremity weakness.  Stat neurology consult was placed.  Per neurology note not a candidate for TNK .  CTA head and neck was negative for acute pathology.  During my encounter with the patient, patient did not have left-sided facial droop.  He had some left hand weakness and tingling in his hand.  He was able to lift his left lower extremity without any issues.  Patient MRI did not show any acute finding.  2D echo with EF of 67%.  Transaminitis improved.  Urine drug screen is negative.  Patient seen by cardiology for his complaints of loss of consciousness and syncopal episodes.  Patient had a tilt able test which was negative but patient was sleeping while standing on tabletop test.  Did have bradycardia with no drop in blood pressure.  Patient is on high-dose metoprolol     Interval Followup:   Blood pressure up.  Sinus bradycardia.  Telemetry without alarms  Patient denies any loss of consciousness since in the hospital.  Patient does have a history of sleepwalking.  Patient wants to know why he has been passing out frequently in the past 6 to 8 weeks.  He has discussed this with his PCP with no intervention.  Mostly happens at home with sleepwalking when he uses commode/shower.  It did happen one time at work where he passed out for a short time without any injury.  No seizure activity noted with this.  There is no palpitations or other warning symptoms before passing out.  No weakness in arms or legs.  No more numbness.  Per cardiology no need of loop recorder.  ABG noted it was obtained due to excessive sleepiness.      DISCHARGE Follow Up Recommendations for labs and diagnostics: Follow-up with sleep specialist at [place] for excessive daytime sleepiness and to monitor efficiency of home CPAP machine.  May need Provigil.  Follow with the PCP in 3 days.  Due to excessive daytime sleepiness Ativan DC'd.  Avoid driving or working at heights or with heavy equipment or machinery until seen  by his  In [place].  Use Plavix for 30 days along with aspirin and then continue home aspirin.  Metoprolol dose decreased by 50% due to bradycardia in mid 40s.      Day of Discharge     Vital Signs:  Temp:  [97.7 °F (36.5 °C)-98.3 °F (36.8 °C)] 97.9 °F (36.6 °C)  Heart Rate:  [68-90] 77  Resp:  [8-20] 20  BP: (131-189)/(62-95) 157/85  Flow (L/min):  [2] 2    Physical Exam:   Constitutional: Awake, alert, no acute distress              Eyes: Pupils equal, sclerae anicteric, no conjunctival injection              HENT: NCAT, mucous membranes moist              Neck: Supple, no thyromegaly, no lymphadenopathy, trachea midline              Respiratory: Clear to auscultation bilaterally, nonlabored respirations               Cardiovascular: RRR, no murmurs, rubs, or gallops, palpable pedal pulses bilaterally              Gastrointestinal: Positive bowel sounds, soft, nontender, nondistended              Musculoskeletal: No bilateral ankle edema, no clubbing or cyanosis to extremities              Psychiatric: Appropriate affect, cooperative              Neurologic: Oriented x 3, strength symmetric in all extremities, Cranial Nerves grossly intact to confrontation, speech clear              Skin: No rashes        Discharge Details        Discharge Medications      New Medications      Instructions Start Date   atorvastatin 80 MG tablet  Commonly known as: LIPITOR   80 mg, Oral, Nightly      clopidogrel 75 MG tablet  Commonly known as: PLAVIX   75 mg, Oral, Daily   Start Date: May 24, 2023     hydrALAZINE 25 MG tablet  Commonly known as: APRESOLINE   25 mg, Oral, 3 Times Daily         Changes to Medications      Instructions Start Date   metoprolol succinate  MG 24 hr tablet  Commonly known as: TOPROL-XL  What changed: how much to take   100 mg, Oral, Daily         Continue These Medications      Instructions Start Date   amLODIPine 10 MG tablet  Commonly known as: NORVASC   10 mg, Oral, Daily      ARIPiprazole 5  MG tablet  Commonly known as: ABILIFY   2.5-5 mg, Oral, Every Night at Bedtime      aspirin 81 MG chewable tablet   81 mg, Oral, Daily      cholecalciferol 25 MCG (1000 UT) tablet  Commonly known as: VITAMIN D3   1,000 Units, Oral, Daily      LACTOBACILLUS PROBIOTIC PO   1 capsule, Oral, Daily      loratadine 10 MG tablet  Commonly known as: CLARITIN   10 mg, Oral, Daily      losartan-hydrochlorothiazide 100-25 MG per tablet  Commonly known as: HYZAAR   1 tablet, Oral, Daily      meloxicam 15 MG tablet  Commonly known as: MOBIC   15 mg, Oral, Daily      omeprazole 40 MG capsule  Commonly known as: priLOSEC   40 mg, Oral, Daily      venlafaxine XR 75 MG 24 hr capsule  Commonly known as: EFFEXOR-XR   1 capsule, Oral, Daily         Stop These Medications    LORazepam 0.5 MG tablet  Commonly known as: ATIVAN            Allergies   Allergen Reactions   • Codeine Hives       Discharge Disposition:  Home or Self Care.  In private vehicle with wife and she has been driving him back to [place].    Diet:  Diet Instructions     Diet: Regular/House Diet, Cardiac Diets; Healthy Heart (2-3 Na+); Regular Texture (IDDSI 7); Thin (IDDSI 0)      Discharge Diet:  Regular/House Diet  Cardiac Diets       Cardiac Diet: Healthy Heart (2-3 Na+)    Texture: Regular Texture (IDDSI 7)    Fluid Consistency: Thin (IDDSI 0)          Discharge Activity:   Activity Instructions     Other Activity Restrictions      Type of Restriction:  Other  Driving  Work       Driving Restrictions: No Driving Until Next Appointment    May Return to Work: Other    Return to Work Instructions: After cleared by PCP and seen by his sleep specialist.    Explain Other Restrictions: Not to work with heavy equipment/machinery and at heights due to excessive sleepiness.          CODE STATUS:  Code Status and Medical Interventions:   Ordered at: 05/21/23 1007     Code Status (Patient has no pulse and is not breathing):    CPR (Attempt to Resuscitate)     Medical  Interventions (Patient has pulse or is breathing):    Full Support     Release to patient:    Routine Release         No future appointments.    Additional Instructions for the Follow-ups that You Need to Schedule     Discharge Follow-up with PCP   As directed       Currently Documented PCP:    Provider, No Known    PCP Phone Number:    None     Follow Up Details: 3 days         Discharge Follow-up with Specialty: Sleep Dr.; 1 Week   As directed      Specialty: Sleep Dr.    Follow Up: 1 Week               Pertinent  and/or Most Recent Results     PROCEDURES:   * Cannot find OR case *     LAB RESULTS:      Lab 05/22/23  0533 05/21/23  0641   WBC 5.99 6.74   HEMOGLOBIN 13.4 13.3   HEMATOCRIT 40.0 39.6   PLATELETS 191 191   NEUTROS ABS  --  3.89   IMMATURE GRANS (ABS)  --  0.03   LYMPHS ABS  --  2.00   MONOS ABS  --  0.59   EOS ABS  --  0.20   MCV 95.7 93.6   PROTIME  --  13.9   APTT  --  29.8         Lab 05/22/23  0533 05/21/23  0640   SODIUM 141 141   POTASSIUM 3.7 3.6   CHLORIDE 102 101   CO2 30.3* 30.4*   ANION GAP 8.7 9.6   BUN 16 19   CREATININE 0.81 0.87   EGFR 110.1 107.8   GLUCOSE 113* 122*   CALCIUM 9.1 9.1   HEMOGLOBIN A1C 5.90*  --          Lab 05/22/23  0533 05/21/23  0640   TOTAL PROTEIN 7.2 7.3   ALBUMIN 4.3 4.3   GLOBULIN 2.9 3.0   ALT (SGPT) 59* 66*   AST (SGOT) 37 45*   BILIRUBIN 0.8 0.4   ALK PHOS 103 106         Lab 05/21/23  0641 05/21/23  0640   HSTROP T  --  10   PROTIME 13.9  --    INR 1.06  --          Lab 05/22/23  0533   CHOLESTEROL 139   LDL CHOL 81   HDL CHOL 32*   TRIGLYCERIDES 149         Lab 05/21/23  1044 05/21/23  0640   ABO TYPING A A   RH TYPING Positive Positive   ANTIBODY SCREEN  --  Negative         Lab 05/23/23  1021   PH, ARTERIAL 7.411   PCO2, ARTERIAL 49.6*   PO2 ART 71.6*   O2 SATURATION ART 93.9*   HCO3 ART 30.8*   BASE EXCESS ART 5.0*   CARBOXYHEMOGLOBIN 0.5     Brief Urine Lab Results  (Last result in the past 365 days)      Color   Clarity   Blood   Leuk Est   Nitrite    Protein   CREAT   Urine HCG        05/21/23 0802 Yellow   Clear   Negative   Negative   Negative   Negative               Microbiology Results (last 10 days)     ** No results found for the last 240 hours. **          CT Angiogram Neck    Result Date: 5/21/2023  Impression:  Normal CT angiogram of the neck.      NICANOR LINDO MD       Electronically Signed and Approved By: NICANOR LINDO MD on 5/21/2023 at 9:46             CT CEREBRAL PERFUSION WITH & WITHOUT CONTRAST    Result Date: 5/21/2023  Impression:  Normal examination.       NICANOR LINDO MD       Electronically Signed and Approved By: NICANOR LINDO MD on 5/21/2023 at 8:29                       Results for orders placed during the hospital encounter of 05/21/23    Adult Transthoracic Echo Complete W/ Cont if Necessary Per Protocol (With Agitated Saline)    Interpretation Summary  •  Left ventricular systolic function is normal. Calculated left ventricular EF = 61.5%  •  Left ventricular diastolic function was normal.      Imaging Results (All)     Procedure Component Value Units Date/Time    MRI Brain Without Contrast [[id number]] Collected: 05/21/23 1642     Updated: 05/21/23 1645    Narrative:      PROCEDURE: MRI BRAIN WO CONTRAST     COMPARISON: [facility], CT, CT HEAD WO CONTRAST STROKE PROTOCOL, 5/21/2023, 6:34.     INDICATIONS: syncope, hallucinations, sleep walking, left upper extremity paresthesia             TECHNIQUE: Multiplanar multisequence images of the brain without intravenous gadolinium.     FINDINGS:  Motion artifact is present.  No evidence of acute ischemia on diffusion-weighted images.  The   ventricles have a normal size and configuration.  No evidence of acute intracranial hemorrhage,   mass or midline shift.  There are chronic appearing changes in the white matter.  There is mild   mucosal thickening in the maxillary sinuses.  No extra-axial fluid collections are identified.  No   sellar or parasellar masses  are identified.     IMPRESSION:  No acute intracranial abnormalities are identified.     NICANOR LINDO MD         Electronically Signed and Approved By: NICANOR LINDO MD on 5/21/2023 at 16:42                     CT Angiogram Head w AI Analysis of LVO [[id number]] Collected: 05/21/23 0948     Updated: 05/21/23 0951    Narrative:      PROCEDURE: CT ANGIOGRAM HEAD W AI ANALYSIS OF LVO     COMPARISON: [facility], CT, CT HEAD WO CONTRAST STROKE PROTOCOL, 5/21/2023, 6:34.     INDICATIONS: Neuro deficit, acute, stroke suspected     PROTOCOL:   Standard imaging protocol performed      RADIATION:   DLP: 945.1 mGy*cm    Automated exposure control was utilized to minimize radiation dose.   CONTRAST: 75 cc Isovue 370 I.V.     TECHNIQUE: After obtaining the patient's consent, CT images of the head were obtained without and   with non-ionic contrast, and multi-planar/3-D imaging were created and interpreted to optimize   visualization of vascular anatomy.       FINDINGS:      The intracranial portion of the internal carotid arteries, major MCA and MARLENY branches are patent.    The distal vertebral arteries, basilar artery and posterior cerebral arteries are patent.  No   evidence of large vessel occlusion, high-grade stenosis, dissection or aneurysm.  There is mild   polypoid mucosal thickening in the maxillary sinuses.     IMPRESSION:  Normal CT angiogram of the head.       NICANOR LINDO MD         Electronically Signed and Approved By: NICANOR LINDO MD on 5/21/2023 at 9:48                     CT Angiogram Neck [[id number]] Collected: 05/21/23 0946     Updated: 05/21/23 0949    Narrative:      PROCEDURE: CT ANGIOGRAM NECK     COMPARISON: [facility], CT, CT HEAD WO CONTRAST STROKE PROTOCOL, 5/21/2023, 6:34.     INDICATIONS: Neuro deficit, acute, stroke suspected     PROTOCOL:   Standard imaging protocol performed      RADIATION:   DLP: 945.1 mGy*cm    Automated exposure control was utilized to  minimize radiation dose.   CONTRAST: 75 cc Isovue 370 I.V.     TECHNIQUE: After obtaining the patient's consent, CT images of the neck were obtained without and   with non-ionic intravenous contrast material. Multi-planar reformatted/3-D images were created to   optimize visualization of vascular anatomy. Unless otherwise stated in this report, all vascular   stenoses involving the internal carotid arteries reported for this examination are derived by   dividing the lesion diameter by the diameter of the normal internal carotid artery more distally.     FINDINGS:   LEFT  INTERNAL CAROTID: No hemodynamically significant stenosis or dissection.    EXTERNAL CAROTID: No hemodynamically significant stenosis or dissection.    COMMON CAROTID: No hemodynamically significant stenosis or dissection.    VERTEBRAL: No hemodynamically significant stenosis or dissection.       RIGHT  INTERNAL CAROTID: No hemodynamically significant stenosis or dissection.    EXTERNAL CAROTID: No hemodynamically significant stenosis or dissection.    COMMON CAROTID: No hemodynamically significant stenosis or dissection.    VERTEBRAL: No hemodynamically significant stenosis or dissection.       OTHER: The visualized soft tissues of the neck are also unremarkable.  Degenerative changes are   present in the cervical spine.       Impression:       Normal CT angiogram of the neck.              NICANOR LINDO MD         Electronically Signed and Approved By: NICAONR LINDO MD on 5/21/2023 at 9:46                     CT CEREBRAL PERFUSION WITH & WITHOUT CONTRAST [[id number]] Collected: 05/21/23 0829     Updated: 05/21/23 0832    Narrative:      PROCEDURE: CT CEREBRAL PERFUSION W WO CONTRAST     COMPARISON: [facility], CT, CT HEAD WO CONTRAST STROKE PROTOCOL, 5/21/2023, 6:34.     INDICATIONS: Neuro deficit, acute, stroke suspected     PROTOCOL:   Standard imaging protocol performed      RADIATION:   DLP: 1301.4 mGy*cm    Automated  exposure control was utilized to minimize radiation dose.   CONTRAST: 80 cc Isovue 370 I.V.        FINDINGS: No CT perfusion defects are identified.  CBF less than 30% 0 mL.  T-max greater than 6   seconds 0 mL.        Impression:       Normal examination.                 NICANOR LINDO MD         Electronically Signed and Approved By: NICANOR LINDO MD on 5/21/2023 at 8:29                     XR Chest 1 View [[id number]] Collected: 05/21/23 0737     Updated: 05/21/23 0740    Narrative:      PROCEDURE: XR CHEST 1 VW     COMPARISON: None     INDICATIONS: Acute Stroke Protocol (Onset < 12 hrs)     FINDINGS:      The lungs are well-expanded. The heart and pulmonary vasculature are within normal limits. No   pleural effusions are identified. There are no active appearing infiltrates.     IMPRESSION: No active disease.     NICANOR LINDO MD         Electronically Signed and Approved By: NICANOR LINDO MD on 5/21/2023 at 7:37                     CT Head Without Contrast Stroke Protocol [[id number]] Collected: 05/21/23 0644     Updated: 05/21/23 0647    Narrative:      PROCEDURE: CT HEAD WO CONTRAST STROKE PROTOCOL     COMPARISON:  None  INDICATIONS: Neuro deficit, acute, stroke suspected     PROTOCOL:   Standard imaging protocol performed      RADIATION:   DLP: 954.5 mGy*cm    MA and/or KV was adjusted to minimize radiation dose.          TECHNIQUE: Axial images of the head without intravenous contrast.     FINDINGS:     The ventricles have a normal size and configuration. There is no evidence of acute intracranial   hemorrhage, mass or midline shift. No extra-axial fluid collections are identified. There are no   skull fractures. The visualized paranasal sinuses and mastoid air cells are grossly clear.     IMPRESSION: Normal exam.     NICANOR LINDO MD         Electronically Signed and Approved By: NICANOR LINDO MD on 5/21/2023 at 6:43                            Labs Pending at Discharge:          Time spent  on Discharge including face to face service: 40 minutes  Part of this note may be an electronic transcription/translation of spoken language to printed text using the Dragon Dictation System.     TElectronically signed by Dedrick Valentin MD, 05/23/23, 3:38 PM EDT.

## 2023-05-23 NOTE — PLAN OF CARE
Goal Outcome Evaluation:      Pt rested well with no complaints of pain.  Pt wore CPAP for about half of night.  NICOLAS PABON RN

## 2023-05-23 NOTE — CONSULTS
"  [facility]   Cardiology Consult Note    Patient Name: Hardy Rendon  : 1975  MRN: [id number]  Primary Care Physician:  Provider, No Known  Referring Physician: Dedrick Valentin MD   Date of admission: 2023    Subjective   Subjective     Reason for Consultation : Recurrent syncope    Chief Complaint : Left-sided numbness    HPI:    Patient is very sleepy and drowsy.  I tried to evaluate the patient last night but could not keep him awake to complete the conversation.  Even this morning, he was frequently going to sleep during the interview.  History may be incomplete.  Some parts of the history are obtained from reviewing medical records.    Hardy Rendon is a 48 y.o. male with morbid obesity, obstructive sleep apnea, hypertension, anxiety, who was admitted to the hospital on 2023 after a fall in the shower hitting his head.  There were concerns for left-sided numbness and facial droop.  Neurology evaluated the patient and advised that he is not a candidate for TNK.  Hence he was managed conservatively.  MRI of the brain did not show any acute strokes.  The neurological symptoms including facial droop and weakness eventually subsided.    Patient later reported that he is having recurrent episodes of passing out for the past several months.  He had an episode 6 weeks back and the most recent one brought him to the emergency room.  Each time it happens when he is standing and mostly in shower.  He also is talking about \" sleepwalking\" which he is not able to elaborate any further.  He has no previous cardiac problems.  He takes medication for high blood pressure.  He denies any chest pain or palpitations.  He feels fatigued all the time.    He is from the [facility] and is here in [place] for work.  However he is not planning to stay anymore and returning home after discharge from the hospital.       Review of Systems   All systems were reviewed and negative except for drowsiness, lethargic, fatigue, " recurrent syncopal episodes.    Personal History     Past Medical History:   Diagnosis Date   • Anxiety    • Hypertension    • Intermittent explosive disorder    • Sleep apnea             Family History: Reviewed, no family history of premature coronary artery disease.    Social History:  reports that he has never smoked. He has never used smokeless tobacco. He reports that he does not drink alcohol and does not use drugs.    Home Medications:  ARIPiprazole, LORazepam, Lactobacillus, amLODIPine, aspirin, cholecalciferol, loratadine, losartan-hydrochlorothiazide, meloxicam, metoprolol succinate XL, omeprazole, and venlafaxine XR    Allergies:  Allergies   Allergen Reactions   • Codeine Hives       Objective    Objective     Vitals:   Temp:  [97.7 °F (36.5 °C)-98.3 °F (36.8 °C)] 97.9 °F (36.6 °C)  Heart Rate:  [67-90] 71  Resp:  [8-20] 18  BP: (131-189)/(62-96) 189/95  Flow (L/min):  [2] 2      Physical Exam:   Constitutional: Awake, alert, morbidly obese, lethargic, drowsy, frequently goes back to sleep during conversation   Eyes: PERRLA, sclerae anicteric, no conjunctival injection   HENT: NCAT, mucous membranes moist   Neck: Supple, no thyromegaly, no lymphadenopathy, trachea midline   Respiratory: Clear to auscultation bilaterally, nonlabored respirations    Cardiovascular: RRR, no murmurs, rubs, or gallops, palpable pedal pulses bilaterally   Gastrointestinal: Positive bowel sounds, soft, nontender, nondistended   Musculoskeletal: No bilateral ankle edema, no clubbing or cyanosis to extremities   Psychiatric: Appropriate affect, cooperative   Neurologic: Oriented x 3, strength symmetric in all extremities, Cranial Nerves grossly intact to confrontation, speech clear   Skin: No rashes     Result Review    Result Review:  I have personally reviewed the results from the time of this admission to 5/23/2023 08:43 EDT and agree with these findings:  [x]  Laboratory  []  Microbiology  [x]  Radiology  [x]  EKG/Telemetry    [x]  Cardiology/Vascular   []  Pathology  [x]  Old records  []  Other:  Most notable findings include:     CMP        5/21/2023    06:40 5/22/2023    05:33   CMP   Glucose 122   113     BUN 19   16     Creatinine 0.87   0.81     EGFR 107.8   110.1     Sodium 141   141     Potassium 3.6   3.7     Chloride 101   102     Calcium 9.1   9.1     Total Protein 7.3   7.2     Albumin 4.3   4.3     Globulin 3.0   2.9     Total Bilirubin 0.4   0.8     Alkaline Phosphatase 106   103     AST (SGOT) 45   37     ALT (SGPT) 66   59     Albumin/Globulin Ratio 1.4   1.5     BUN/Creatinine Ratio 21.8   19.8     Anion Gap 9.6   8.7        CBC        5/21/2023    06:41 5/22/2023    05:33   CBC   WBC 6.74   5.99     RBC 4.23   4.18     Hemoglobin 13.3   13.4     Hematocrit 39.6   40.0     MCV 93.6   95.7     MCH 31.4   32.1     MCHC 33.6   33.5     RDW 13.9   13.7     Platelets 191   191        Lab Results   Component Value Date    TROPONINT 10 05/21/2023     Results for orders placed during the hospital encounter of 05/21/23    Adult Transthoracic Echo Complete W/ Cont if Necessary Per Protocol (With Agitated Saline)    Interpretation Summary  •  Left ventricular systolic function is normal. Calculated left ventricular EF = 61.5%  •  Left ventricular diastolic function was normal.      Assessment & Plan   Assessment / Plan     Brief Patient Summary:  Hardy Rendon is a 48 y.o. male with morbid obesity, obstructive sleep apnea, hypertension, anxiety, who was admitted to the hospital on 5/21/2023 after a fall in the shower hitting his head.  He is also reporting recurrent episodes of syncope    Active Hospital Problems:  Active Hospital Problems    Diagnosis    • **Left-sided weakness      Recurrent syncope/presyncope : Mostly positional, happening at the shower.  History is somewhat atypical for neurocardiogenic etiology.  He is also reporting sleepwalking.  Echocardiogram is unremarkable.  Telemetry during admission did not show any  tacky or bradycardia arrhythmias.  From cardiac standpoint, will need to rule out neurocardiogenic syncope.    Left-sided weakness/facial droop : Currently being treated as TIA    Severe obstructive sleep apnea    Essential hypertension : Blood pressure not well controlled    Plan:     We will do a tilt table study today for evaluation neurocardiogenic syncope.    If unremarkable, patient would benefit from a one month event monitor study.  Patient does not live in this area, hence recommend to have the done at his place after discharge.    Management of TIA per neurology and primary team    Electronically signed by Jw Doty MD, 05/23/23, 8:43 AM EDT.

## 2023-05-23 NOTE — PLAN OF CARE
Goal Outcome Evaluation:               Patient wearing cpap of 8 cmh2o with 30% bled in at night and tolerating it well

## 2023-05-23 NOTE — OUTREACH NOTE
Prep Survey    Flowsheet Row Responses   [facility] patient discharged from? [facility]   Is LACE score < 7 ? Yes   Eligibility Not Eligible   What are the reasons patient is not eligible? Other  [[facility]]   Does the patient have one of the following disease processes/diagnoses(primary or secondary)? Other   Prep survey completed? Yes          Deborah CRUM - Registered Nurse

## 2023-05-23 NOTE — PLAN OF CARE
Goal Outcome Evaluation:  Plan of Care Reviewed With: patient        Progress: improving          Pt discharging home. Dc'ed telemetry and IV.

## 2023-05-23 NOTE — PLAN OF CARE
Goal Outcome Evaluation:  Plan of Care Reviewed With: patient        Progress: no change  Outcome Evaluation: Pt wore cpap for a few hours last night. V60 is at bedside on stand by. Pt is now on a 3L NC resting comfortably.

## 2023-05-30 LAB — GLUCOSE BLDC GLUCOMTR-MCNC: 116 MG/DL (ref 70–99)

## 2023-06-06 LAB — QT INTERVAL: 410 MS
